# Patient Record
Sex: MALE | Race: BLACK OR AFRICAN AMERICAN | Employment: OTHER | URBAN - METROPOLITAN AREA
[De-identification: names, ages, dates, MRNs, and addresses within clinical notes are randomized per-mention and may not be internally consistent; named-entity substitution may affect disease eponyms.]

---

## 2017-06-07 ENCOUNTER — HOSPITAL ENCOUNTER (OUTPATIENT)
Age: 57
Discharge: HOME | End: 2017-06-07
Payer: COMMERCIAL

## 2017-06-07 LAB — PROSTATE SPECIFIC ANTIGEN: 0.08 UG/L

## 2017-06-07 PROCEDURE — 84153 ASSAY OF PSA TOTAL: CPT

## 2017-06-07 PROCEDURE — 36415 COLL VENOUS BLD VENIPUNCTURE: CPT

## 2018-02-07 ENCOUNTER — OFFICE VISIT (OUTPATIENT)
Dept: INTERNAL MEDICINE | Age: 58
End: 2018-02-07
Payer: COMMERCIAL

## 2018-02-07 VITALS
SYSTOLIC BLOOD PRESSURE: 131 MMHG | OXYGEN SATURATION: 98 % | HEIGHT: 72 IN | WEIGHT: 195 LBS | DIASTOLIC BLOOD PRESSURE: 86 MMHG | RESPIRATION RATE: 12 BRPM | HEART RATE: 96 BPM | BODY MASS INDEX: 26.41 KG/M2

## 2018-02-07 DIAGNOSIS — M54.6 ACUTE LEFT-SIDED THORACIC BACK PAIN: Primary | ICD-10-CM

## 2018-02-07 PROCEDURE — 99202 OFFICE O/P NEW SF 15 MIN: CPT | Performed by: INTERNAL MEDICINE

## 2018-02-07 RX ORDER — CYCLOBENZAPRINE HCL 10 MG
10 TABLET ORAL 3 TIMES DAILY PRN
Qty: 30 TABLET | Refills: 0 | Status: SHIPPED | OUTPATIENT
Start: 2018-02-07 | End: 2018-02-17

## 2018-02-07 ASSESSMENT — ENCOUNTER SYMPTOMS
ALLERGIC/IMMUNOLOGIC NEGATIVE: 1
BACK PAIN: 1
EYES NEGATIVE: 1
RESPIRATORY NEGATIVE: 1
GASTROINTESTINAL NEGATIVE: 1

## 2018-02-07 ASSESSMENT — PATIENT HEALTH QUESTIONNAIRE - PHQ9
1. LITTLE INTEREST OR PLEASURE IN DOING THINGS: 0
SUM OF ALL RESPONSES TO PHQ9 QUESTIONS 1 & 2: 0
2. FEELING DOWN, DEPRESSED OR HOPELESS: 0
SUM OF ALL RESPONSES TO PHQ QUESTIONS 1-9: 0

## 2018-02-07 NOTE — PROGRESS NOTES
addressed. All his questions were answered. Pt voiced understanding. Maggy Graf will continue current medications, diet and exercise. Orders Placed This Encounter   Medications    cyclobenzaprine (FLEXERIL) 10 MG tablet     Sig: Take 1 tablet by mouth 3 times daily as needed for Muscle spasms     Dispense:  30 tablet     Refill:  0          Completed Refills               Requested Prescriptions     Signed Prescriptions Disp Refills    cyclobenzaprine (FLEXERIL) 10 MG tablet 30 tablet 0     Sig: Take 1 tablet by mouth 3 times daily as needed for Muscle spasms       4. Patient given educational materials - see patient instructions    5. Was a self-tracking handout given in paper form or via [employer]? No  If yes, see orders or list here. Orders Placed This Encounter   Procedures    XR CHEST STANDARD (2 VW)       No Follow-up on file. Patient agreed with treatment plan.     Electronically signed by Becky Silva MD on 2/7/2018 at 4:01 PM

## 2018-02-12 ENCOUNTER — HOSPITAL ENCOUNTER (OUTPATIENT)
Age: 58
Discharge: HOME | End: 2018-02-12
Payer: COMMERCIAL

## 2018-02-12 LAB — PROSTATE SPECIFIC ANTIGEN: 0.06 UG/L

## 2018-02-12 PROCEDURE — 36415 COLL VENOUS BLD VENIPUNCTURE: CPT

## 2018-02-12 PROCEDURE — 84153 ASSAY OF PSA TOTAL: CPT

## 2019-04-22 ENCOUNTER — HOSPITAL ENCOUNTER (OUTPATIENT)
Age: 59
Discharge: HOME | End: 2019-04-22
Payer: COMMERCIAL

## 2019-04-22 LAB — PROSTATE SPECIFIC ANTIGEN: 0.03 UG/L

## 2019-04-22 PROCEDURE — 84153 ASSAY OF PSA TOTAL: CPT

## 2019-04-22 PROCEDURE — 36415 COLL VENOUS BLD VENIPUNCTURE: CPT

## 2020-06-09 ENCOUNTER — HOSPITAL ENCOUNTER (OUTPATIENT)
Age: 60
Discharge: HOME | End: 2020-06-09

## 2020-06-09 LAB — PROSTATE SPECIFIC ANTIGEN: 0.02 UG/L

## 2020-06-09 PROCEDURE — 84153 ASSAY OF PSA TOTAL: CPT

## 2020-06-09 PROCEDURE — 36415 COLL VENOUS BLD VENIPUNCTURE: CPT

## 2021-10-21 ENCOUNTER — HOSPITAL ENCOUNTER (OUTPATIENT)
Age: 61
Discharge: HOME | End: 2021-10-21
Payer: COMMERCIAL

## 2021-10-21 LAB — PROSTATE SPECIFIC ANTIGEN: <0.02 UG/L

## 2021-10-21 PROCEDURE — 84153 ASSAY OF PSA TOTAL: CPT

## 2021-10-21 PROCEDURE — 36415 COLL VENOUS BLD VENIPUNCTURE: CPT

## 2022-10-21 ENCOUNTER — HOSPITAL ENCOUNTER (OUTPATIENT)
Age: 62
Discharge: HOME | End: 2022-10-21
Payer: COMMERCIAL

## 2022-10-21 LAB — PROSTATE SPECIFIC ANTIGEN: <0.02 NG/ML

## 2022-10-21 PROCEDURE — 84153 ASSAY OF PSA TOTAL: CPT

## 2022-10-21 PROCEDURE — 36415 COLL VENOUS BLD VENIPUNCTURE: CPT

## 2023-01-10 ENCOUNTER — OFFICE VISIT (OUTPATIENT)
Dept: INTERNAL MEDICINE CLINIC | Age: 63
End: 2023-01-10
Payer: COMMERCIAL

## 2023-01-10 VITALS
DIASTOLIC BLOOD PRESSURE: 88 MMHG | OXYGEN SATURATION: 97 % | WEIGHT: 214 LBS | HEART RATE: 80 BPM | BODY MASS INDEX: 29.43 KG/M2 | SYSTOLIC BLOOD PRESSURE: 132 MMHG

## 2023-01-10 DIAGNOSIS — Z12.11 COLON CANCER SCREENING: ICD-10-CM

## 2023-01-10 DIAGNOSIS — Z13.220 SCREENING FOR HYPERLIPIDEMIA: ICD-10-CM

## 2023-01-10 DIAGNOSIS — Z00.00 WELL ADULT EXAM: ICD-10-CM

## 2023-01-10 DIAGNOSIS — K40.90 RIGHT INGUINAL HERNIA: Primary | ICD-10-CM

## 2023-01-10 PROCEDURE — 99203 OFFICE O/P NEW LOW 30 MIN: CPT | Performed by: INTERNAL MEDICINE

## 2023-01-10 PROCEDURE — G8419 CALC BMI OUT NRM PARAM NOF/U: HCPCS | Performed by: INTERNAL MEDICINE

## 2023-01-10 PROCEDURE — 1036F TOBACCO NON-USER: CPT | Performed by: INTERNAL MEDICINE

## 2023-01-10 PROCEDURE — 3017F COLORECTAL CA SCREEN DOC REV: CPT | Performed by: INTERNAL MEDICINE

## 2023-01-10 PROCEDURE — G8427 DOCREV CUR MEDS BY ELIG CLIN: HCPCS | Performed by: INTERNAL MEDICINE

## 2023-01-10 PROCEDURE — G8484 FLU IMMUNIZE NO ADMIN: HCPCS | Performed by: INTERNAL MEDICINE

## 2023-01-10 RX ORDER — TADALAFIL 5 MG/1
5 TABLET ORAL PRN
COMMUNITY

## 2023-01-10 ASSESSMENT — PATIENT HEALTH QUESTIONNAIRE - PHQ9
SUM OF ALL RESPONSES TO PHQ QUESTIONS 1-9: 0
SUM OF ALL RESPONSES TO PHQ QUESTIONS 1-9: 0
SUM OF ALL RESPONSES TO PHQ9 QUESTIONS 1 & 2: 0
SUM OF ALL RESPONSES TO PHQ QUESTIONS 1-9: 0
1. LITTLE INTEREST OR PLEASURE IN DOING THINGS: 0
2. FEELING DOWN, DEPRESSED OR HOPELESS: 0
SUM OF ALL RESPONSES TO PHQ QUESTIONS 1-9: 0

## 2023-01-10 ASSESSMENT — ENCOUNTER SYMPTOMS: ABDOMINAL PAIN: 1

## 2023-01-10 NOTE — PROGRESS NOTES
Visit Information    Have you changed or started any medications since your last visit including any over-the-counter medicines, vitamins, or herbal medicines? no   Are you having any side effects from any of your medications? -  no  Have you stopped taking any of your medications? Is so, why? -  no    Have you seen any other physician or provider since your last visit? No  Have you had any other diagnostic tests since your last visit? No  Have you been seen in the emergency room and/or had an admission to a hospital since we last saw you? No  Have you had your routine dental cleaning in the past 6 months? no    Have you activated your [employer] account? If not, what are your barriers?  Yes     Patient Care Team:  Leanna Durand MD as PCP - Oniel Wilcox MD as PCP - [facility]    Medical History Review  Past Medical, Family, and Social History reviewed and does contribute to the patient presenting condition    Health Maintenance   Topic Date Due    Depression Screen  Never done    HIV screen  Never done    Hepatitis C screen  Never done    DTaP/Tdap/Td vaccine (1 - Tdap) 07/25/1999    Shingles vaccine (1 of 2) Never done    Lipids  11/20/2020    COVID-19 Vaccine (5 - Booster for Moderna series) 11/09/2021    Flu vaccine (1) 08/01/2022    Prostate Specific Antigen (PSA) Screening or Monitoring  10/21/2023    Colorectal Cancer Screen  01/15/2025    Hepatitis A vaccine  Aged Out    Hib vaccine  Aged Out    Meningococcal (ACWY) vaccine  Aged Out    Pneumococcal 0-64 years Vaccine  Aged Out

## 2023-01-10 NOTE — PROGRESS NOTES
[address] [facility] [facility] [address]  [place] [address]  Dept: [phone]  Dept Fax: [phone]    Cyndie Forrester is a 60 y.o. male who presents today for his medicalconditions/complaints as noted below. Cyndie Forrester is c/o of Groin Swelling (Been going on for awhile) and New Patient (Haven't seen PCP in 2018)      HPI:     Abdominal Pain  This is a chronic problem. The current episode started more than 1 year ago. The problem occurs constantly. The pain is located in the suprapubic region (right inguinal area). The abdominal pain does not radiate. Nothing aggravates the pain. The pain is relieved by Nothing. He has tried nothing for the symptoms. Past Medical History:   Diagnosis Date    Acute left-sided thoracic back pain 2/7/2018    Cancer [facility])      with radiation and seeds, prostate        Current Outpatient Medications   Medication Sig Dispense Refill    tadalafil (CIALIS) 5 MG tablet Take 5 mg by mouth as needed for Erectile Dysfunction       No current facility-administered medications for this visit. No Known Allergies    Health Maintenance   Topic Date Due    Depression Screen  Never done    HIV screen  Never done    Hepatitis C screen  Never done    DTaP/Tdap/Td vaccine (1 - Tdap) 07/25/1999    Shingles vaccine (1 of 2) Never done    Lipids  11/20/2020    COVID-19 Vaccine (5 - Booster for Moderna series) 11/09/2021    Flu vaccine (1) 08/01/2022    Prostate Specific Antigen (PSA) Screening or Monitoring  10/21/2023    Colorectal Cancer Screen  01/15/2025    Hepatitis A vaccine  Aged Out    Hib vaccine  Aged Out    Meningococcal (ACWY) vaccine  Aged Out    Pneumococcal 0-64 years Vaccine  Aged Out       Subjective:      Review of Systems   Gastrointestinal:  Positive for abdominal pain. All other systems reviewed and are negative. Objective:     Physical Exam  Vitals reviewed. Constitutional:       Appearance: He is well-developed.    HENT:      Head: Normocephalic and atraumatic. Eyes:      Conjunctiva/sclera: Conjunctivae normal.      Pupils: Pupils are equal, round, and reactive to light. Neck:      Thyroid: No thyromegaly. Vascular: No JVD. Cardiovascular:      Rate and Rhythm: Normal rate and regular rhythm. Heart sounds: Normal heart sounds. No murmur heard. Pulmonary:      Effort: Pulmonary effort is normal.      Breath sounds: Normal breath sounds. Abdominal:      General: Bowel sounds are normal.      Palpations: Abdomen is soft. Hernia: A hernia is present. Hernia is present in the right inguinal area. Musculoskeletal:         General: Normal range of motion. Cervical back: Normal range of motion and neck supple. Skin:     General: Skin is warm and dry. Neurological:      Mental Status: He is alert and oriented to person, place, and time. Deep Tendon Reflexes: Reflexes are normal and symmetric. /88 (Site: Right Upper Arm, Position: Sitting)   Pulse 80   Wt 214 lb (97.1 kg)   SpO2 97%   BMI 29.43 kg/m²       Assessment:       Diagnosis Orders   1. Right inguinal hernia  CT ABDOMEN PELVIS W IV CONTRAST Additional Contrast? Radiologist Recommendation    [address] [facility], [facility], General Surgery, [place]      2. Screening for hyperlipidemia  Lipid Panel      3. Well adult exam  Basic Metabolic Panel    CBC with Auto Differential      4. Colon cancer screening  Flavio Carvalho MD, Gastroenterology, [place]          Plan:      No follow-ups on file. No orders of the defined types were placed in this encounter.     Orders Placed This Encounter   Procedures    CT ABDOMEN PELVIS W IV CONTRAST Additional Contrast? Radiologist Recommendation     Standing Status:   Future     Standing Expiration Date:   1/10/2024     Order Specific Question:   Additional Contrast?     Answer:   Radiologist Recommendation     Order Specific Question:   STAT Creatinine as needed:     Answer:   No    Lipid Panel     Standing Status: Future     Standing Expiration Date:   1/10/2024     Order Specific Question:   Is Patient Fasting?/# of Hours     Answer:   No    Basic Metabolic Panel     Standing Status:   Future     Standing Expiration Date:   1/10/2024    CBC with Auto Differential     Standing Status:   Future     Standing Expiration Date:   1/10/2024    Gabriel Mejia DO, General Surgery, [place]     Referral Priority:   Routine     Referral Type:   Eval and Treat     Referral Reason:   Specialty Services Required     Referred to Provider:   Blas Scott DO     Requested Specialty:   General Surgery     Number of Visits Requested:   Murray Hernandez MD, Gastroenterology, [place]     Referral Priority:   Routine     Referral Type:   Eval and Treat     Referral Reason:   Specialty Services Required     Referred to Provider:   Alma Alvarado MD     Requested Specialty:   Gastroenterology     Number of Visits Requested:   1            Patient given educational materials - see patient instructions. Discussed use, benefit, and side effects of prescribed medications. All patientquestions answered. Pt voiced understanding.     Electronically signed by Juwan Quiles MD on 1/10/2023at 11:26 AM

## 2023-01-13 ENCOUNTER — HOSPITAL ENCOUNTER (OUTPATIENT)
Dept: CT IMAGING | Age: 63
Discharge: HOME | End: 2023-01-13
Payer: COMMERCIAL

## 2023-01-13 ENCOUNTER — HOSPITAL ENCOUNTER (OUTPATIENT)
Age: 63
Discharge: HOME | End: 2023-01-13
Payer: COMMERCIAL

## 2023-01-13 DIAGNOSIS — Z00.00 WELL ADULT EXAM: ICD-10-CM

## 2023-01-13 DIAGNOSIS — K40.90 RIGHT INGUINAL HERNIA: ICD-10-CM

## 2023-01-13 DIAGNOSIS — Z13.220 SCREENING FOR HYPERLIPIDEMIA: ICD-10-CM

## 2023-01-13 LAB
ABSOLUTE EOS #: 0.1 K/UL (ref 0–0.44)
ABSOLUTE IMMATURE GRANULOCYTE: <0.03 K/UL (ref 0–0.3)
ABSOLUTE LYMPH #: 2.67 K/UL (ref 1.1–3.7)
ABSOLUTE MONO #: 0.65 K/UL (ref 0.1–1.2)
ANION GAP SERPL CALCULATED.3IONS-SCNC: 7 MMOL/L (ref 9–17)
BASOPHILS # BLD: 1 % (ref 0–2)
BASOPHILS ABSOLUTE: 0.05 K/UL (ref 0–0.2)
BUN BLDV-MCNC: 17 MG/DL (ref 8–23)
BUN/CREAT BLD: 14 (ref 9–20)
CALCIUM SERPL-MCNC: 9.1 MG/DL (ref 8.6–10.4)
CHLORIDE BLD-SCNC: 104 MMOL/L (ref 98–107)
CHOLESTEROL/HDL RATIO: 4
CHOLESTEROL: 176 MG/DL
CO2: 29 MMOL/L (ref 20–31)
CREAT SERPL-MCNC: 1.19 MG/DL (ref 0.7–1.2)
EOSINOPHILS RELATIVE PERCENT: 2 % (ref 1–4)
GFR SERPL CREATININE-BSD FRML MDRD: >60 ML/MIN/1.73M2
GLUCOSE BLD-MCNC: 107 MG/DL (ref 70–99)
HCT VFR BLD CALC: 48.7 % (ref 40.7–50.3)
HDLC SERPL-MCNC: 44 MG/DL
HEMOGLOBIN: 16.3 G/DL (ref 13–17)
IMMATURE GRANULOCYTES: 0 %
LDL CHOLESTEROL: 120 MG/DL (ref 0–130)
LYMPHOCYTES # BLD: 39 % (ref 24–43)
MCH RBC QN AUTO: 28.9 PG (ref 25.2–33.5)
MCHC RBC AUTO-ENTMCNC: 33.5 G/DL (ref 28.4–34.8)
MCV RBC AUTO: 86.3 FL (ref 82.6–102.9)
MONOCYTES # BLD: 9 % (ref 3–12)
NRBC AUTOMATED: 0 PER 100 WBC
PDW BLD-RTO: 14.3 % (ref 11.8–14.4)
PLATELET # BLD: 178 K/UL (ref 138–453)
PMV BLD AUTO: 11.3 FL (ref 8.1–13.5)
POTASSIUM SERPL-SCNC: 4.3 MMOL/L (ref 3.7–5.3)
RBC # BLD: 5.64 M/UL (ref 4.21–5.77)
SEG NEUTROPHILS: 49 % (ref 36–65)
SEGMENTED NEUTROPHILS ABSOLUTE COUNT: 3.4 K/UL (ref 1.5–8.1)
SODIUM BLD-SCNC: 140 MMOL/L (ref 135–144)
TRIGL SERPL-MCNC: 61 MG/DL
WBC # BLD: 6.9 K/UL (ref 3.5–11.3)

## 2023-01-13 PROCEDURE — 6360000004 HC RX CONTRAST MEDICATION: Performed by: INTERNAL MEDICINE

## 2023-01-13 PROCEDURE — 80061 LIPID PANEL: CPT

## 2023-01-13 PROCEDURE — 80048 BASIC METABOLIC PNL TOTAL CA: CPT

## 2023-01-13 PROCEDURE — A4641 RADIOPHARM DX AGENT NOC: HCPCS | Performed by: INTERNAL MEDICINE

## 2023-01-13 PROCEDURE — 36415 COLL VENOUS BLD VENIPUNCTURE: CPT

## 2023-01-13 PROCEDURE — 85025 COMPLETE CBC W/AUTO DIFF WBC: CPT

## 2023-01-13 PROCEDURE — 2580000003 HC RX 258: Performed by: INTERNAL MEDICINE

## 2023-01-13 PROCEDURE — 74177 CT ABD & PELVIS W/CONTRAST: CPT

## 2023-01-13 RX ORDER — SODIUM CHLORIDE 0.9 % (FLUSH) 0.9 %
10 SYRINGE (ML) INJECTION ONCE
Status: COMPLETED | OUTPATIENT
Start: 2023-01-13 | End: 2023-01-13

## 2023-01-13 RX ORDER — 0.9 % SODIUM CHLORIDE 0.9 %
80 INTRAVENOUS SOLUTION INTRAVENOUS ONCE
Status: COMPLETED | OUTPATIENT
Start: 2023-01-13 | End: 2023-01-13

## 2023-01-13 RX ADMIN — SODIUM CHLORIDE 80 ML: 9 INJECTION, SOLUTION INTRAVENOUS at 13:52

## 2023-01-13 RX ADMIN — SODIUM CHLORIDE, PRESERVATIVE FREE 10 ML: 5 INJECTION INTRAVENOUS at 13:52

## 2023-01-13 RX ADMIN — IOPAMIDOL 75 ML: 755 INJECTION, SOLUTION INTRAVENOUS at 13:46

## 2023-01-13 RX ADMIN — BARIUM SULFATE 450 ML: 20 SUSPENSION ORAL at 13:52

## 2023-01-30 ENCOUNTER — OFFICE VISIT (OUTPATIENT)
Dept: SURGERY | Age: 63
End: 2023-01-30
Payer: COMMERCIAL

## 2023-01-30 VITALS
TEMPERATURE: 97.8 F | WEIGHT: 213 LBS | BODY MASS INDEX: 29.29 KG/M2 | SYSTOLIC BLOOD PRESSURE: 138 MMHG | HEART RATE: 84 BPM | DIASTOLIC BLOOD PRESSURE: 91 MMHG

## 2023-01-30 DIAGNOSIS — K40.90 NON-RECURRENT UNILATERAL INGUINAL HERNIA WITHOUT OBSTRUCTION OR GANGRENE: Primary | ICD-10-CM

## 2023-01-30 PROCEDURE — 3017F COLORECTAL CA SCREEN DOC REV: CPT | Performed by: STUDENT IN AN ORGANIZED HEALTH CARE EDUCATION/TRAINING PROGRAM

## 2023-01-30 PROCEDURE — G8484 FLU IMMUNIZE NO ADMIN: HCPCS | Performed by: STUDENT IN AN ORGANIZED HEALTH CARE EDUCATION/TRAINING PROGRAM

## 2023-01-30 PROCEDURE — G8427 DOCREV CUR MEDS BY ELIG CLIN: HCPCS | Performed by: STUDENT IN AN ORGANIZED HEALTH CARE EDUCATION/TRAINING PROGRAM

## 2023-01-30 PROCEDURE — G8419 CALC BMI OUT NRM PARAM NOF/U: HCPCS | Performed by: STUDENT IN AN ORGANIZED HEALTH CARE EDUCATION/TRAINING PROGRAM

## 2023-01-30 PROCEDURE — 99204 OFFICE O/P NEW MOD 45 MIN: CPT | Performed by: STUDENT IN AN ORGANIZED HEALTH CARE EDUCATION/TRAINING PROGRAM

## 2023-01-30 PROCEDURE — 1036F TOBACCO NON-USER: CPT | Performed by: STUDENT IN AN ORGANIZED HEALTH CARE EDUCATION/TRAINING PROGRAM

## 2023-01-30 RX ORDER — SODIUM CHLORIDE 0.9 % (FLUSH) 0.9 %
5-40 SYRINGE (ML) INJECTION EVERY 12 HOURS SCHEDULED
OUTPATIENT
Start: 2023-01-30

## 2023-01-30 RX ORDER — SODIUM CHLORIDE, SODIUM LACTATE, POTASSIUM CHLORIDE, CALCIUM CHLORIDE 600; 310; 30; 20 MG/100ML; MG/100ML; MG/100ML; MG/100ML
INJECTION, SOLUTION INTRAVENOUS CONTINUOUS
OUTPATIENT
Start: 2023-01-30

## 2023-01-30 RX ORDER — SODIUM CHLORIDE 9 MG/ML
INJECTION, SOLUTION INTRAVENOUS PRN
OUTPATIENT
Start: 2023-01-30

## 2023-01-30 RX ORDER — SODIUM CHLORIDE 0.9 % (FLUSH) 0.9 %
5-40 SYRINGE (ML) INJECTION PRN
OUTPATIENT
Start: 2023-01-30

## 2023-01-30 NOTE — PROGRESS NOTES
History and Physical - Surgery Clinic    Patient's Name: Estefani Khanna  MRN: [id number]  YOB: 1960 (60 y.o.)    Date of Visit: January 30, 2023     CC: Right inguinal hernia    HPI: Estefani Khanna is a/an 60 y.o. male who presents to [address] [place] [address] for evaluation of right inguinal hernia. Patient states that several months ago he noticed a bulge in his right groin which continues to increase in size. He denies discomfort, but states that recently when he eats he becomes full quickly and had increased reflux which is not normal for him. He denies changes in bowel movements. He is up-to-date on colon cancer screening. He does have a history of prostate cancer status post radiation and seeds. Patient underwent a CT scan prior to evaluation with oral contrast which notes that there is a right inguinal hernia that is can tenting bowel. Contrast moves through the hernia without issues. Past Medical History:   Diagnosis Date    Acute left-sided thoracic back pain 2/7/2018    Cancer [facility])      with radiation and seeds, prostate       No past surgical history on file. Current Outpatient Medications   Medication Sig Dispense Refill    tadalafil (CIALIS) 5 MG tablet Take 5 mg by mouth as needed for Erectile Dysfunction       No current facility-administered medications for this visit. No Known Allergies    No family history on file.     Social History     Socioeconomic History    Marital status:      Spouse name: Not on file    Number of children: Not on file    Years of education: Not on file    Highest education level: Not on file   Occupational History    Not on file   Tobacco Use    Smoking status: Never    Smokeless tobacco: Never   Substance and Sexual Activity    Alcohol use: No    Drug use: No    Sexual activity: Not on file   Other Topics Concern    Not on file   Social History Narrative    Not on file     Social Determinants of Health     Financial Resource Strain: Not on file   Food Insecurity: Not on file   Transportation Needs: Not on file   Physical Activity: Not on file   Stress: Not on file   Social Connections: Not on file   Intimate Partner Violence: Not on file   Housing Stability: Not on file       Review of Systems:   GEN: Denies recent weight loss, fatigue, fevers, chills. HEENT: No rhinorrhea, dysphagia, odynphagia. CV: Denies recent chest pain. No history of MI or arrhythmias. RESP: Denies shortness of breath, COPD, asthma. GI: As per HPI  : History of prostate cancer  HEM[de-identified] Denies history of anemia or DVTs. ENDO: Denies history of thyroid problems or diabetes. MSK: Denies joint and back pain. NEURO: Denies history of previous stroke    Physical Exam:    Vitals:    01/30/23 0930   BP: (!) 138/91   Pulse: 84   Temp: 97.8 °F (36.6 °C)       General: Alert and oriented x 3. Non toxic in appearance. No acute distress. Head[de-identified]  Non traumatic  Eyes: pupils reactive,  EOMI bilaterally  Neck: trachea midline. Heart: Regular rate and rhythm. Lungs: No respiratory distress, clear breath sounds bilaterally. Abdomen: Soft, nondistended, nontender to palpation. Hernia defect noted in the right groin and easily reducible. Extremity: No gross deformity in all four extremities. Skin: No skin lesion, erythema, rash. Neuro: CN II-XII grossly intact. No motor or sensory deficits appreciated. MSK: Strength intact in all four extremities. Imaging:     CT scan performed on 1/13/2023 was reviewed. An obvious right inguinal hernia is identified with small bowel noted. There is a possible left inguinal hernia as well. Seeds noted around the area of the prostate. Patient also has liver cysts which appear to be benign noted on exam.    Assessment  Reducible right inguinal hernia      Plan:  Plan to take the patient to the operating room for robotic assisted laparoscopic right inguinal hernia repair, possible mesh, possible bilateral, all indicated procedures. The procedure was explained in detail along with risk versus benefits and alternatives to the patient. I reviewed the CT scan and the anatomy of the with [facility] as well. I answered and addressed all of his questions and concerns. I discussed the possibility he may have a hernia on the left side, which she verbalized he would like to proceed with a bilateral inguinal hernia repair if needed. I educated [facility] on the warning signs of incarcerated inguinal hernia including pain at the groin, inability for the hernia to be reduced induced, overlying skin changes, inability to keep food or water down, fevers, chills. [facility] verbalized understanding and knows to proceed to the emergency department if any of these occur between now and the OR. We discussed postoperatively patient would have a 2-week restriction of no lifting, pushing, pulling greater than 10 pounds. No driving while taking narcotics or until he feels safe that he can slam on the brakes and the need to avoid a car wreck. With follow-up with the patient 2 weeks postoperatively.       Rona Fabian, DO  1/30/2023

## 2023-01-31 NOTE — H&P
HISTORY and [facility] [address]       NAME:  Katelyn Mckee  MRN: [id number]   YOB: 1960   Date: 2023   Age: 60 y.o. Gender: male       COMPLAINT AND PRESENT HISTORY:       Katelyn Mckee is 60 y.o.,  male, Preadmission Testing for : RIGHT INGUINAL HERNIA. Patient will be havin [facility] W/MESH, POSSIBLE OPEN, POSSIBLE BILATERAL, ALL INDICATED PROCEDURES    See office notes of Dr. Riya Ortega on 2023  CC: Right inguinal hernia  HPI: Katelyn Mckee is a/an 60 y.o. male who presents to [facility] for evaluation of right inguinal hernia. Patient states that several months ago he noticed a bulge in his right groin which continues to increase in size. He denies discomfort, but states that recently when he eats he becomes full quickly and had increased reflux which is not normal for him. He denies changes in bowel movements. He is up-to-date on colon cancer screening. He does have a history of prostate cancer status post radiation and seeds. Patient underwent a CT scan prior to evaluation with oral contrast which notes that there is a right inguinal hernia that is can tenting bowel. Contrast moves through the hernia without issues. Above reviewed with patient and he concurs    Patient has symptoms of : bulging out . Patient noticed the Hernia 2 months ago. Patient reports that the Hernia grew in size and is  non tender and more expansile . Pt states that now he is more conscious of it. Pt reports that he is  more relieved with hernia when: lying down ; standing aggravates hernia more. Pt denies any constipation or bowel blockage. No fever or chills. No significant medical history. Today patient exhibits BP with elevated diastolic reading. Writer did manual BP and did obtain 138/ 90. Patient was advised to monitor at home. Prior reading indicate normal readings. Pt denies any sxs.       Anticoagulants or blood thinners: no     Pt denies any  chest pain, palpitations or diaphoresis. No recent URI, SOB, fever or chills. Patient denies any personal hx of blood clots. Functional Capacity per patient:              1. Patient is able to walk 2 city blocks on level ground without SOB. 2. Patient is able to climb 2 flights of stairs without SOB. Patient denies any personal or family hx of problems with  Anesthesia. Imaging:   CT OF THE ABDOMEN AND PELVIS WITH CONTRAST 1/13/2023  1. Small bowel containing right inguinal hernia without associated  inflammation or obstruction. 2. Chronic findings outlined in the body of the report. PAST MEDICAL HISTORY     Past Medical History:   Diagnosis Date    Acute left-sided thoracic back pain 02/07/2018    Cancer [facility])      with radiation and seeds, prostate    Macular degeneration     Left eye     SURGICAL HISTORY       Past Surgical History:   Procedure Laterality Date    COLONOSCOPY         FAMILY HISTORY     History reviewed. No pertinent family history. SOCIAL HISTORY       Social History     Socioeconomic History    Marital status:      Spouse name: None    Number of children: None    Years of education: None    Highest education level: None   Tobacco Use    Smoking status: Never    Smokeless tobacco: Never   Vaping Use    Vaping Use: Never used   Substance and Sexual Activity    Alcohol use: No    Drug use: No           REVIEW OF SYSTEMS      No Known Allergies    Current Outpatient Medications on File Prior to Encounter   Medication Sig Dispense Refill    tadalafil (CIALIS) 5 MG tablet Take 5 mg by mouth as needed for Erectile Dysfunction       No current facility-administered medications on file prior to encounter. General health:  Fairly good. No fever or chills. Skin:  No itching, redness or rash. HEENT:  No headache, epistaxis or sore throat. Neck:  No pain, stiffness or masses. Cardiovascular/Respiratory system:  No chest pain, palpitation or shortness of breath. Gastrointestinal tract: See HPI. Genitourinary:  No burning on micturition. No hesitancy, urgency, frequency or discoloration of urine. Locomotor:  No bone or joint pains. No swelling. Neuropsychiatric:  No referable complaints. Negative except for what is mentioned in the HPI. GENERAL PHYSICAL EXAM:     Vitals: BP (!) 135/100   Pulse 68   Temp 97.9 °F (36.6 °C) (Temporal)   Resp 20   Ht 5' 11.5\" (1.816 m)   Wt 213 lb (96.6 kg)   SpO2 100%   BMI 29.29 kg/m²  Body mass index is 29.29 kg/m². Retaken at  138/90 RA. GENERAL APPEARANCE:   Maria C Tineo is 60 y.o., male, moderately obese, nourished, conscious, alert. Does not appear to be distress or pain at this time. SKIN:  Warm, dry, no cyanosis or jaundice. HEAD:  Normocephalic, atraumatic, no swelling or tenderness. EYES:  Pupils equal, reactive to light. EARS:  No discharge, no marked hearing loss. NOSE:  No rhinorrhea, epistaxis or septal deformity. THROAT:  Not congested. No ulceration bleeding or discharge. NECK:  No stiffness, trachea central.                  CHEST:  Symmetrical and equal on expansion. HEART:  RRR S1 > S2. No audible murmurs or gallops. LUNGS:  Equal on expansion, normal breath sounds. No adventitious sounds. ABDOMEN:  Obese. Soft on palpation. Normoactive bowel sounds. No localized tenderness. No guarding or rigidity. Inguinal Hernia- exam deferred to surgeon         LYMPHATICS:  No palpable cervical lymphadenopathy. LOCOMOTOR, BACK AND SPINE:  No tenderness or deformities. EXTREMITIES:  Symmetrical, no pretibial edema. No calf tenderness.   No discoloration or ulcerations. NEUROLOGIC:  The patient is conscious, alert, oriented,Cranial nerve II-XII intact, taste and smell were not examined. No apparent focal sensory or motor deficits. Lab Results   Component Value Date    WBC 6.9 01/13/2023    RBC 5.64 01/13/2023    HGB 16.3 01/13/2023    HCT 48.7 01/13/2023    MCV 86.3 01/13/2023    MCH 28.9 01/13/2023    MCHC 33.5 01/13/2023    RDW 14.3 01/13/2023     01/13/2023    MPV 11.3 01/13/2023        Lab Results   Component Value Date     01/13/2023    K 4.3 01/13/2023     01/13/2023    CO2 29 01/13/2023    BUN 17 01/13/2023    CREATININE 1.19 01/13/2023    GLUCOSE 107 (H) 01/13/2023    CALCIUM 9.1 01/13/2023                                         EKG REVIEW        Today's EKG reads Normal  sinus rhythm with left atrial enlargement           PROVISIONAL DIAGNOSES / SURGERY:      LAPAROSCOPIC ROBOTIC XI RIGHT INGUINAL HERNIA REPAIR W/MESH, POSSIBLE OPEN, POSSIBLE BILATERAL, ALL INDICATED PROCEDURES    RIGHT INGUINAL HERNIA      Patient Active Problem List    Diagnosis Date Noted    Non-recurrent unilateral inguinal hernia without obstruction or gangrene 01/30/2023    Acute left-sided thoracic back pain 02/07/2018    Elevated blood pressure reading 03/26/2014    Prostate cancer ([facility] [address] 10/22/2013    Dysuria 10/22/2013       CLEARANCE: Dr. Natty Hernandez, anesthesia, was contacted and informed of patient's history and planned surgery. Orders received and no clearance required.       ZEINA HANEY, APRN - CNP on 2/1/2023 at 12:12 PM    Total time spent on encounter- PAT provider minutes: 21-30 minutes

## 2023-02-01 ENCOUNTER — HOSPITAL ENCOUNTER (OUTPATIENT)
Dept: PREADMISSION TESTING | Age: 63
Discharge: HOME | End: 2023-02-01
Attending: STUDENT IN AN ORGANIZED HEALTH CARE EDUCATION/TRAINING PROGRAM | Admitting: STUDENT IN AN ORGANIZED HEALTH CARE EDUCATION/TRAINING PROGRAM
Payer: COMMERCIAL

## 2023-02-01 VITALS
TEMPERATURE: 97.9 F | HEART RATE: 68 BPM | SYSTOLIC BLOOD PRESSURE: 135 MMHG | BODY MASS INDEX: 28.85 KG/M2 | RESPIRATION RATE: 20 BRPM | WEIGHT: 213 LBS | OXYGEN SATURATION: 100 % | DIASTOLIC BLOOD PRESSURE: 100 MMHG | HEIGHT: 72 IN

## 2023-02-01 PROBLEM — Z01.818 PRE-OPERATIVE CLEARANCE: Status: ACTIVE | Noted: 2023-02-01

## 2023-02-01 PROCEDURE — APPSS45 APP SPLIT SHARED TIME 31-45 MINUTES: Performed by: NURSE PRACTITIONER

## 2023-02-01 PROCEDURE — 93005 ELECTROCARDIOGRAM TRACING: CPT | Performed by: ANESTHESIOLOGY

## 2023-02-01 NOTE — DISCHARGE INSTRUCTIONS
Pre-op Instructions For Out-Patient Surgery    Medication Instructions:  Please stop herbs and any supplements now (includes vitamins and minerals). Please contact your surgeon and prescribing physician for pre-op instructions for any blood thinners. If you have inhalers/aerosol treatments at home, please use them the morning of your surgery and bring the inhalers with you to the hospital.    Please take the following medications the morning of your surgery with a sip of water:    None     Surgery Instructions:  After midnight before surgery:  Do not eat or drink anything, including water, mints, gum, and hard candy. You may brush your teeth without swallowing. No smoking, chewing tobacco, or street drugs. Please shower or bathe before surgery. If you were given Surgical Scrub Chlorhexidine Gluconate Liquid (CHG), please shower the night before and the morning of your surgery following the detailed instructions you received during your pre-admission visit. Please do not wear any cologne, lotion, powder, deodorant, jewelry, piercings, perfume, makeup, nail polish, hair accessories, or hair spray on the day of surgery. Wear loose comfortable clothing. Leave your valuables at home. Bring a storage case for any glasses/contacts. An adult who is responsible for you MUST drive you home and should be with you for the first 24 hours after surgery. If having out-patient knee and foot surgeries, please arrange for planned crutches, walker, or wheelchair before arriving to the hospital.    The Day of Surgery:  Arrive at [facility] at the time directed by your surgeon and check in at the desk. If you have a living will or healthcare power of , please bring a copy. You will be taken to the pre-op holding area where you will be prepared for surgery.   A physical assessment will be performed by a nurse practitioner or house officer. Your IV will be started and you will meet your anesthesiologist.    When you go to surgery, your family will be directed to the surgical waiting room, where the doctor should speak with them after your surgery. After surgery, you will be taken to the recovery room then when you are awake and stable you will go to the short stay unit for preparation to be discharged. If you use a Bi-PAP or C-PAP machine, please bring it with you and leave it in the car in case it is needed in recovery room.

## 2023-02-02 LAB
EKG ATRIAL RATE: 72 BPM
EKG P AXIS: 51 DEGREES
EKG P-R INTERVAL: 158 MS
EKG Q-T INTERVAL: 372 MS
EKG QRS DURATION: 70 MS
EKG QTC CALCULATION (BAZETT): 407 MS
EKG R AXIS: 9 DEGREES
EKG T AXIS: 11 DEGREES
EKG VENTRICULAR RATE: 72 BPM

## 2023-02-02 PROCEDURE — 93010 ELECTROCARDIOGRAM REPORT: CPT | Performed by: INTERNAL MEDICINE

## 2023-02-09 ENCOUNTER — ANESTHESIA EVENT (OUTPATIENT)
Dept: OPERATING ROOM | Age: 63
End: 2023-02-09
Payer: COMMERCIAL

## 2023-02-09 NOTE — PRE-PROCEDURE INSTRUCTIONS
Nothing to eat after midnight. Y  Are you taking any blood thinners? When was the last day? N  Make sure to use Hibiclens prior to surgery. Y  Remove any jewelry and body piercings. Y  Do you wear glasses? If so, please bring a case to store them in. Y  Are you having any Covid symptoms? N  Do you have any new rashes, infections, etc. that we should be aware of? N  Do you have a ride home the day of surgery? It cannot be a cab or medical transportation.  Y  Verify surgery time and what time to arrive at hospital. [id number]

## 2023-02-10 ENCOUNTER — ANESTHESIA (OUTPATIENT)
Dept: OPERATING ROOM | Age: 63
End: 2023-02-10
Payer: COMMERCIAL

## 2023-02-10 ENCOUNTER — HOSPITAL ENCOUNTER (OUTPATIENT)
Age: 63
Setting detail: OUTPATIENT SURGERY
Discharge: HOME | End: 2023-02-10
Attending: STUDENT IN AN ORGANIZED HEALTH CARE EDUCATION/TRAINING PROGRAM | Admitting: STUDENT IN AN ORGANIZED HEALTH CARE EDUCATION/TRAINING PROGRAM
Payer: COMMERCIAL

## 2023-02-10 VITALS
OXYGEN SATURATION: 100 % | DIASTOLIC BLOOD PRESSURE: 86 MMHG | TEMPERATURE: 99.3 F | RESPIRATION RATE: 14 BRPM | HEART RATE: 95 BPM | SYSTOLIC BLOOD PRESSURE: 150 MMHG

## 2023-02-10 DIAGNOSIS — G89.18 ACUTE POST-OPERATIVE PAIN: Primary | ICD-10-CM

## 2023-02-10 PROCEDURE — 2580000003 HC RX 258: Performed by: NURSE ANESTHETIST, CERTIFIED REGISTERED

## 2023-02-10 PROCEDURE — 6360000002 HC RX W HCPCS: Performed by: ANESTHESIOLOGY

## 2023-02-10 PROCEDURE — 7100000011 HC PHASE II RECOVERY - ADDTL 15 MIN: Performed by: STUDENT IN AN ORGANIZED HEALTH CARE EDUCATION/TRAINING PROGRAM

## 2023-02-10 PROCEDURE — 7100000031 HC ASPR PHASE II RECOVERY - ADDTL 15 MIN: Performed by: STUDENT IN AN ORGANIZED HEALTH CARE EDUCATION/TRAINING PROGRAM

## 2023-02-10 PROCEDURE — 3600000009 HC SURGERY ROBOT BASE: Performed by: STUDENT IN AN ORGANIZED HEALTH CARE EDUCATION/TRAINING PROGRAM

## 2023-02-10 PROCEDURE — 3600000019 HC SURGERY ROBOT ADDTL 15MIN: Performed by: STUDENT IN AN ORGANIZED HEALTH CARE EDUCATION/TRAINING PROGRAM

## 2023-02-10 PROCEDURE — S2900 ROBOTIC SURGICAL SYSTEM: HCPCS | Performed by: STUDENT IN AN ORGANIZED HEALTH CARE EDUCATION/TRAINING PROGRAM

## 2023-02-10 PROCEDURE — 2500000003 HC RX 250 WO HCPCS: Performed by: NURSE ANESTHETIST, CERTIFIED REGISTERED

## 2023-02-10 PROCEDURE — 3700000001 HC ADD 15 MINUTES (ANESTHESIA): Performed by: STUDENT IN AN ORGANIZED HEALTH CARE EDUCATION/TRAINING PROGRAM

## 2023-02-10 PROCEDURE — 3700000000 HC ANESTHESIA ATTENDED CARE: Performed by: STUDENT IN AN ORGANIZED HEALTH CARE EDUCATION/TRAINING PROGRAM

## 2023-02-10 PROCEDURE — 2500000003 HC RX 250 WO HCPCS: Performed by: ANESTHESIOLOGY

## 2023-02-10 PROCEDURE — 7100000000 HC PACU RECOVERY - FIRST 15 MIN: Performed by: STUDENT IN AN ORGANIZED HEALTH CARE EDUCATION/TRAINING PROGRAM

## 2023-02-10 PROCEDURE — 6370000000 HC RX 637 (ALT 250 FOR IP): Performed by: ANESTHESIOLOGY

## 2023-02-10 PROCEDURE — 7100000030 HC ASPR PHASE II RECOVERY - FIRST 15 MIN: Performed by: STUDENT IN AN ORGANIZED HEALTH CARE EDUCATION/TRAINING PROGRAM

## 2023-02-10 PROCEDURE — 6360000002 HC RX W HCPCS: Performed by: NURSE ANESTHETIST, CERTIFIED REGISTERED

## 2023-02-10 PROCEDURE — 2580000003 HC RX 258: Performed by: STUDENT IN AN ORGANIZED HEALTH CARE EDUCATION/TRAINING PROGRAM

## 2023-02-10 PROCEDURE — 6360000002 HC RX W HCPCS: Performed by: STUDENT IN AN ORGANIZED HEALTH CARE EDUCATION/TRAINING PROGRAM

## 2023-02-10 PROCEDURE — 2500000003 HC RX 250 WO HCPCS: Performed by: STUDENT IN AN ORGANIZED HEALTH CARE EDUCATION/TRAINING PROGRAM

## 2023-02-10 PROCEDURE — C1781 MESH (IMPLANTABLE): HCPCS | Performed by: STUDENT IN AN ORGANIZED HEALTH CARE EDUCATION/TRAINING PROGRAM

## 2023-02-10 PROCEDURE — 7100000010 HC PHASE II RECOVERY - FIRST 15 MIN: Performed by: STUDENT IN AN ORGANIZED HEALTH CARE EDUCATION/TRAINING PROGRAM

## 2023-02-10 PROCEDURE — 2709999900 HC NON-CHARGEABLE SUPPLY: Performed by: STUDENT IN AN ORGANIZED HEALTH CARE EDUCATION/TRAINING PROGRAM

## 2023-02-10 PROCEDURE — 7100000001 HC PACU RECOVERY - ADDTL 15 MIN: Performed by: STUDENT IN AN ORGANIZED HEALTH CARE EDUCATION/TRAINING PROGRAM

## 2023-02-10 DEVICE — MESH HERN W16XL12CM LAP MFIL POLY TEREPHTHALATE MFIL: Type: IMPLANTABLE DEVICE | Site: GROIN | Status: FUNCTIONAL

## 2023-02-10 RX ORDER — SODIUM CHLORIDE 9 MG/ML
INJECTION, SOLUTION INTRAVENOUS PRN
Status: DISCONTINUED | OUTPATIENT
Start: 2023-02-10 | End: 2023-02-10 | Stop reason: HOSPADM

## 2023-02-10 RX ORDER — SODIUM CHLORIDE 0.9 % (FLUSH) 0.9 %
5-40 SYRINGE (ML) INJECTION PRN
Status: DISCONTINUED | OUTPATIENT
Start: 2023-02-10 | End: 2023-02-10 | Stop reason: HOSPADM

## 2023-02-10 RX ORDER — LIDOCAINE HYDROCHLORIDE AND EPINEPHRINE 10; 10 MG/ML; UG/ML
INJECTION, SOLUTION INFILTRATION; PERINEURAL PRN
Status: DISCONTINUED | OUTPATIENT
Start: 2023-02-10 | End: 2023-02-10 | Stop reason: ALTCHOICE

## 2023-02-10 RX ORDER — HYDRALAZINE HYDROCHLORIDE 20 MG/ML
10 INJECTION INTRAMUSCULAR; INTRAVENOUS
Status: DISCONTINUED | OUTPATIENT
Start: 2023-02-10 | End: 2023-02-10 | Stop reason: HOSPADM

## 2023-02-10 RX ORDER — ACETAMINOPHEN 500 MG
1000 TABLET ORAL ONCE
Status: COMPLETED | OUTPATIENT
Start: 2023-02-10 | End: 2023-02-10

## 2023-02-10 RX ORDER — ROCURONIUM BROMIDE 10 MG/ML
INJECTION, SOLUTION INTRAVENOUS PRN
Status: DISCONTINUED | OUTPATIENT
Start: 2023-02-10 | End: 2023-02-10 | Stop reason: SDUPTHER

## 2023-02-10 RX ORDER — ONDANSETRON 4 MG/1
4 TABLET, FILM COATED ORAL 3 TIMES DAILY PRN
Qty: 15 TABLET | Refills: 0 | Status: SHIPPED | OUTPATIENT
Start: 2023-02-10

## 2023-02-10 RX ORDER — EPHEDRINE SULFATE 50 MG/ML
INJECTION, SOLUTION INTRAVENOUS PRN
Status: DISCONTINUED | OUTPATIENT
Start: 2023-02-10 | End: 2023-02-10 | Stop reason: SDUPTHER

## 2023-02-10 RX ORDER — SODIUM CHLORIDE 0.9 % (FLUSH) 0.9 %
5-40 SYRINGE (ML) INJECTION EVERY 12 HOURS SCHEDULED
Status: DISCONTINUED | OUTPATIENT
Start: 2023-02-10 | End: 2023-02-10 | Stop reason: HOSPADM

## 2023-02-10 RX ORDER — OXYCODONE HYDROCHLORIDE 5 MG/1
5 TABLET ORAL EVERY 6 HOURS PRN
Qty: 20 TABLET | Refills: 0 | Status: SHIPPED | OUTPATIENT
Start: 2023-02-10 | End: 2023-02-15

## 2023-02-10 RX ORDER — SODIUM CHLORIDE, SODIUM LACTATE, POTASSIUM CHLORIDE, CALCIUM CHLORIDE 600; 310; 30; 20 MG/100ML; MG/100ML; MG/100ML; MG/100ML
INJECTION, SOLUTION INTRAVENOUS CONTINUOUS
Status: DISCONTINUED | OUTPATIENT
Start: 2023-02-10 | End: 2023-02-10 | Stop reason: HOSPADM

## 2023-02-10 RX ORDER — FENTANYL CITRATE 50 UG/ML
INJECTION, SOLUTION INTRAMUSCULAR; INTRAVENOUS PRN
Status: DISCONTINUED | OUTPATIENT
Start: 2023-02-10 | End: 2023-02-10 | Stop reason: SDUPTHER

## 2023-02-10 RX ORDER — GABAPENTIN 300 MG/1
300 CAPSULE ORAL ONCE
Status: COMPLETED | OUTPATIENT
Start: 2023-02-10 | End: 2023-02-10

## 2023-02-10 RX ORDER — FENTANYL CITRATE 0.05 MG/ML
25 INJECTION, SOLUTION INTRAMUSCULAR; INTRAVENOUS EVERY 5 MIN PRN
Status: DISCONTINUED | OUTPATIENT
Start: 2023-02-10 | End: 2023-02-10 | Stop reason: HOSPADM

## 2023-02-10 RX ORDER — DIPHENHYDRAMINE HYDROCHLORIDE 50 MG/ML
12.5 INJECTION INTRAMUSCULAR; INTRAVENOUS
Status: DISCONTINUED | OUTPATIENT
Start: 2023-02-10 | End: 2023-02-10 | Stop reason: HOSPADM

## 2023-02-10 RX ORDER — ONDANSETRON 2 MG/ML
4 INJECTION INTRAMUSCULAR; INTRAVENOUS
Status: COMPLETED | OUTPATIENT
Start: 2023-02-10 | End: 2023-02-10

## 2023-02-10 RX ORDER — MIDAZOLAM HYDROCHLORIDE 1 MG/ML
INJECTION INTRAMUSCULAR; INTRAVENOUS PRN
Status: DISCONTINUED | OUTPATIENT
Start: 2023-02-10 | End: 2023-02-10 | Stop reason: SDUPTHER

## 2023-02-10 RX ORDER — DOCUSATE SODIUM 100 MG/1
100 CAPSULE, LIQUID FILLED ORAL 2 TIMES DAILY
Qty: 60 CAPSULE | Refills: 0 | Status: SHIPPED | OUTPATIENT
Start: 2023-02-10 | End: 2023-03-12

## 2023-02-10 RX ORDER — SUCCINYLCHOLINE/SOD CL,ISO/PF 200MG/10ML
SYRINGE (ML) INTRAVENOUS PRN
Status: DISCONTINUED | OUTPATIENT
Start: 2023-02-10 | End: 2023-02-10 | Stop reason: SDUPTHER

## 2023-02-10 RX ORDER — METOCLOPRAMIDE HYDROCHLORIDE 5 MG/ML
10 INJECTION INTRAMUSCULAR; INTRAVENOUS
Status: COMPLETED | OUTPATIENT
Start: 2023-02-10 | End: 2023-02-10

## 2023-02-10 RX ORDER — PROPOFOL 10 MG/ML
INJECTION, EMULSION INTRAVENOUS PRN
Status: DISCONTINUED | OUTPATIENT
Start: 2023-02-10 | End: 2023-02-10 | Stop reason: SDUPTHER

## 2023-02-10 RX ORDER — LIDOCAINE HYDROCHLORIDE 10 MG/ML
1 INJECTION, SOLUTION EPIDURAL; INFILTRATION; INTRACAUDAL; PERINEURAL
Status: COMPLETED | OUTPATIENT
Start: 2023-02-10 | End: 2023-02-10

## 2023-02-10 RX ORDER — LABETALOL HYDROCHLORIDE 5 MG/ML
10 INJECTION, SOLUTION INTRAVENOUS
Status: DISCONTINUED | OUTPATIENT
Start: 2023-02-10 | End: 2023-02-10 | Stop reason: HOSPADM

## 2023-02-10 RX ORDER — HYDROCODONE BITARTRATE AND ACETAMINOPHEN 5; 325 MG/1; MG/1
1 TABLET ORAL EVERY 6 HOURS PRN
Status: COMPLETED | OUTPATIENT
Start: 2023-02-10 | End: 2023-02-10

## 2023-02-10 RX ORDER — SODIUM CHLORIDE 9 MG/ML
25 INJECTION, SOLUTION INTRAVENOUS PRN
Status: DISCONTINUED | OUTPATIENT
Start: 2023-02-10 | End: 2023-02-10 | Stop reason: HOSPADM

## 2023-02-10 RX ORDER — BUPIVACAINE HYDROCHLORIDE 2.5 MG/ML
INJECTION, SOLUTION EPIDURAL; INFILTRATION; INTRACAUDAL PRN
Status: DISCONTINUED | OUTPATIENT
Start: 2023-02-10 | End: 2023-02-10 | Stop reason: ALTCHOICE

## 2023-02-10 RX ORDER — SODIUM CHLORIDE, SODIUM LACTATE, POTASSIUM CHLORIDE, CALCIUM CHLORIDE 600; 310; 30; 20 MG/100ML; MG/100ML; MG/100ML; MG/100ML
INJECTION, SOLUTION INTRAVENOUS CONTINUOUS PRN
Status: DISCONTINUED | OUTPATIENT
Start: 2023-02-10 | End: 2023-02-10 | Stop reason: SDUPTHER

## 2023-02-10 RX ADMIN — HYDROCODONE BITARTRATE AND ACETAMINOPHEN 1 TABLET: 5; 325 TABLET ORAL at 16:35

## 2023-02-10 RX ADMIN — LIDOCAINE HYDROCHLORIDE 1 ML: 10 INJECTION, SOLUTION EPIDURAL; INFILTRATION; INTRACAUDAL; PERINEURAL at 10:53

## 2023-02-10 RX ADMIN — METOCLOPRAMIDE 10 MG: 5 INJECTION, SOLUTION INTRAMUSCULAR; INTRAVENOUS at 16:57

## 2023-02-10 RX ADMIN — GABAPENTIN 300 MG: 300 CAPSULE ORAL at 10:53

## 2023-02-10 RX ADMIN — FENTANYL CITRATE 50 MCG: 50 INJECTION, SOLUTION INTRAMUSCULAR; INTRAVENOUS at 13:19

## 2023-02-10 RX ADMIN — HYDROMORPHONE HYDROCHLORIDE 0.5 MG: 1 INJECTION, SOLUTION INTRAMUSCULAR; INTRAVENOUS; SUBCUTANEOUS at 15:52

## 2023-02-10 RX ADMIN — ONDANSETRON 4 MG: 2 INJECTION INTRAMUSCULAR; INTRAVENOUS at 15:52

## 2023-02-10 RX ADMIN — ROCURONIUM BROMIDE 30 MG: 10 INJECTION, SOLUTION INTRAVENOUS at 13:28

## 2023-02-10 RX ADMIN — ACETAMINOPHEN 1000 MG: 500 TABLET ORAL at 10:53

## 2023-02-10 RX ADMIN — SODIUM CHLORIDE, POTASSIUM CHLORIDE, SODIUM LACTATE AND CALCIUM CHLORIDE: 600; 310; 30; 20 INJECTION, SOLUTION INTRAVENOUS at 11:14

## 2023-02-10 RX ADMIN — SODIUM CHLORIDE, POTASSIUM CHLORIDE, SODIUM LACTATE AND CALCIUM CHLORIDE: 600; 310; 30; 20 INJECTION, SOLUTION INTRAVENOUS at 13:10

## 2023-02-10 RX ADMIN — ROCURONIUM BROMIDE 10 MG: 10 INJECTION, SOLUTION INTRAVENOUS at 14:42

## 2023-02-10 RX ADMIN — Medication 2000 MG: at 13:27

## 2023-02-10 RX ADMIN — EPHEDRINE SULFATE 10 MG: 50 INJECTION INTRAMUSCULAR; INTRAVENOUS; SUBCUTANEOUS at 13:54

## 2023-02-10 RX ADMIN — MIDAZOLAM 2 MG: 1 INJECTION INTRAMUSCULAR; INTRAVENOUS at 13:12

## 2023-02-10 RX ADMIN — Medication 140 MG: at 13:19

## 2023-02-10 RX ADMIN — FENTANYL CITRATE 50 MCG: 50 INJECTION, SOLUTION INTRAMUSCULAR; INTRAVENOUS at 13:15

## 2023-02-10 RX ADMIN — HYDROMORPHONE HYDROCHLORIDE 0.5 MG: 1 INJECTION, SOLUTION INTRAMUSCULAR; INTRAVENOUS; SUBCUTANEOUS at 16:01

## 2023-02-10 RX ADMIN — SODIUM CHLORIDE, POTASSIUM CHLORIDE, SODIUM LACTATE AND CALCIUM CHLORIDE: 600; 310; 30; 20 INJECTION, SOLUTION INTRAVENOUS at 13:50

## 2023-02-10 RX ADMIN — PROPOFOL 200 MG: 10 INJECTION, EMULSION INTRAVENOUS at 13:19

## 2023-02-10 ASSESSMENT — PAIN DESCRIPTION - DESCRIPTORS
DESCRIPTORS: SHARP
DESCRIPTORS: SORE

## 2023-02-10 ASSESSMENT — PAIN SCALES - GENERAL
PAINLEVEL_OUTOF10: 9
PAINLEVEL_OUTOF10: 9
PAINLEVEL_OUTOF10: 6
PAINLEVEL_OUTOF10: 9
PAINLEVEL_OUTOF10: 9
PAINLEVEL_OUTOF10: 5
PAINLEVEL_OUTOF10: 6

## 2023-02-10 ASSESSMENT — PAIN DESCRIPTION - LOCATION
LOCATION: ABDOMEN
LOCATION: ABDOMEN

## 2023-02-10 ASSESSMENT — PAIN - FUNCTIONAL ASSESSMENT: PAIN_FUNCTIONAL_ASSESSMENT: NONE - DENIES PAIN

## 2023-02-10 ASSESSMENT — PAIN DESCRIPTION - ORIENTATION
ORIENTATION: MID
ORIENTATION: LOWER

## 2023-02-10 ASSESSMENT — PAIN DESCRIPTION - ONSET
ONSET: AWAKENED FROM SLEEP
ONSET: AWAKENED FROM SLEEP

## 2023-02-10 ASSESSMENT — PAIN DESCRIPTION - PAIN TYPE
TYPE: SURGICAL PAIN
TYPE: SURGICAL PAIN

## 2023-02-10 NOTE — ANESTHESIA PRE PROCEDURE
Department of Anesthesiology  Preprocedure Note       Name:  Loyd Hampton   Age:  60 y.o.  :  1960                                          MRN:  [id number]         Date:  2/10/2023      Surgeon: Juan A Warner):  Francine Gerber DO    Procedure: Procedure(s):  LAPAROSCOPIC ROBOTIC XI RIGHT INGUINAL HERNIA REPAIR W/MESH, POSSIBLE OPEN, POSSIBLE BILATERAL, ALL INDICATED PROCEDURES    Medications prior to admission:   Prior to Admission medications    Medication Sig Start Date End Date Taking?  Authorizing Provider   tadalafil (CIALIS) 5 MG tablet Take 5 mg by mouth as needed for Erectile Dysfunction    Historical Provider, MD       Current medications:    Current Facility-Administered Medications   Medication Dose Route Frequency Provider Last Rate Last Admin    sodium chloride flush 0.9 % injection 5-40 mL  5-40 mL IntraVENous 2 times per day Dario Sandoval MD        sodium chloride flush 0.9 % injection 5-40 mL  5-40 mL IntraVENous PRN Nara Hays MD        0.9 % sodium chloride infusion   IntraVENous PRN Dario Sandoval MD        lactated ringers IV soln infusion   IntraVENous Continuous Nara Hays MD        sodium chloride flush 0.9 % injection 5-40 mL  5-40 mL IntraVENous 2 times per day Nazia Fabian, DO        sodium chloride flush 0.9 % injection 5-40 mL  5-40 mL IntraVENous PRN Nazia Fabian, DO        0.9 % sodium chloride infusion   IntraVENous PRN Nazia Fabian, DO        lactated ringers IV soln infusion   IntraVENous Continuous Nazia Fabian,  mL/hr at 02/10/23 1114 New Bag at 02/10/23 1114    ceFAZolin (ANCEF) 2000 mg in 0.9% sodium chloride 50 mL IVPB  2,000 mg IntraVENous On Call to OR Nazia Fabian DO           Allergies:  No Known Allergies    Problem List:    Patient Active Problem List   Diagnosis Code    Prostate cancer ([facility] [address] C61    Dysuria R30.0    Elevated blood pressure reading R03.0    Acute left-sided thoracic back pain M54.6    Non-recurrent unilateral inguinal hernia without obstruction or gangrene K40.90    Pre-operative clearance Z01.818       Past Medical History:        Diagnosis Date    Acute left-sided thoracic back pain 02/07/2018    Cancer [facility])      with radiation and seeds, prostate    Macular degeneration     Left eye       Past Surgical History:        Procedure Laterality Date    COLONOSCOPY         Social History:    Social History     Tobacco Use    Smoking status: Never    Smokeless tobacco: Never   Substance Use Topics    Alcohol use:  No                                Counseling given: Not Answered      Vital Signs (Current):   Vitals:    02/10/23 1045   BP: (!) 148/95   Pulse: 95   Resp: 20   Temp: 97.1 °F (36.2 °C)   TempSrc: Infrared   SpO2: 99%                                              BP Readings from Last 3 Encounters:   02/10/23 (!) 148/95   02/01/23 (!) 135/100   01/30/23 (!) 138/91       NPO Status: Time of last liquid consumption: 2100                        Time of last solid consumption: 2100                        Date of last liquid consumption: 02/09/23                        Date of last solid food consumption: 02/09/23    BMI:   Wt Readings from Last 3 Encounters:   02/01/23 213 lb (96.6 kg)   01/30/23 213 lb (96.6 kg)   01/10/23 214 lb (97.1 kg)     There is no height or weight on file to calculate BMI.    CBC:   Lab Results   Component Value Date/Time    WBC 6.9 01/13/2023 12:08 PM    RBC 5.64 01/13/2023 12:08 PM    HGB 16.3 01/13/2023 12:08 PM    HCT 48.7 01/13/2023 12:08 PM    MCV 86.3 01/13/2023 12:08 PM    RDW 14.3 01/13/2023 12:08 PM     01/13/2023 12:08 PM       CMP:   Lab Results   Component Value Date/Time     01/13/2023 12:08 PM    K 4.3 01/13/2023 12:08 PM     01/13/2023 12:08 PM    CO2 29 01/13/2023 12:08 PM    BUN 17 01/13/2023 12:08 PM    CREATININE 1.19 01/13/2023 12:08 PM    GFRAA >60 11/20/2015 09:40 AM    LABGLOM >60 01/13/2023 12:08 PM    GLUCOSE 107 01/13/2023 12:08 PM    CALCIUM 9.1 01/13/2023 12:08 PM       POC Tests: No results for input(s): POCGLU, POCNA, POCK, POCCL, POCBUN, POCHEMO, POCHCT in the last 72 hours. Coags:   Lab Results   Component Value Date/Time    PROTIME 11.7 03/14/2013 02:06 PM    INR 1.1 03/14/2013 02:06 PM    APTT 26.8 03/14/2013 02:06 PM       HCG (If Applicable): No results found for: PREGTESTUR, PREGSERUM, HCG, HCGQUANT     ABGs: No results found for: PHART, PO2ART, VXD4ORM, FDD2ZAZ, BEART, U1TIRVOL     Type & Screen (If Applicable):  No results found for: LABABO, LABRH    Drug/Infectious Status (If Applicable):  No results found for: HIV, HEPCAB    COVID-19 Screening (If Applicable): No results found for: COVID19        Anesthesia Evaluation  Patient summary reviewed and Nursing notes reviewed no history of anesthetic complications:   Airway: Mallampati: II  TM distance: >3 FB   Neck ROM: full  Mouth opening: > = 3 FB   Dental:          Pulmonary:Negative Pulmonary ROS and normal exam  breath sounds clear to auscultation                             Cardiovascular:  Exercise tolerance: good (>4 METS),         ECG reviewed  Rhythm: regular  Rate: normal                 ROS comment: Elevated blood pressure     Neuro/Psych:   Negative Neuro/Psych ROS              GI/Hepatic/Renal: Neg GI/Hepatic/Renal ROS            Endo/Other:    (+) malignancy/cancer (prostate). ROS comment: Left eye macular degeneration  Right inguinal hernia Abdominal:             Vascular: Other Findings:           Anesthesia Plan      general     ASA 2       Induction: intravenous. MIPS: Postoperative opioids intended and Prophylactic antiemetics administered. Anesthetic plan and risks discussed with patient. Plan discussed with CRNA.                     Debi Kern MD   2/10/2023

## 2023-02-10 NOTE — DISCHARGE INSTRUCTIONS
Post Operative Inguinal Hernia Discharge Instructions    WOUND CARE:   You have glue and steri-strips overlying your incisions, with dissolvable sutures. Allow the glue and steri-strips to fall off naturally. If they begin to peel, it is ok to trim the the steri-strips. If the glue and steri-strips remain intact at 2 weeks after your surgery, it is ok to remove at that time. Do not submerge your incisions in water (baths, pools, hot tubs, etc) for 2 weeks. BATHING:    Ok to shower 24 hrs after your operation. Wash incisions gently with soap and allow water to wash down over the incisions. Do not submerge surgical incisions in water (baths, pools, hot tubs, lakes) for 2 weeks. DRIVING:   You may return to driving when you are NOT taking narcotic pain medications and your pain is minimal enough where you can safely slam on the breaks or turn suddenly if you needed to. For most patients take 10-14 days before they feel comfortable enough they could move quickly if needed. ACTIVITY:  No pushing, pulling, or lifting more than 10 lbs for 2 weeks. If a work note is needed, please call the surgery office or discuss at your first post operative visit  No strenuous exercise for 6 weeks - including weight lifting, sprinting, plyometric exercises for 6 weeks. Listen to your body -- if you are experiencing pain - refrain from such activity, except for walking. You should be walking daily to prevent blood clots in your veins. When your pain is resolving it is ok to:  Walk, climb stairs, ride a STATIONARY bike    LIFTING:   No lifting greater than 10 lbs for 2 weeks    DIET:   [address] to resume regular diet. Start slow with non greasy and fatty foods. Some experience nausea and vomiting after anesthesia - which may last 24 hours. MEDICATIONS:     Take medications as prescribed. Constipation Medications  It is important for you to take stool softeners (Doculax, Senna, Senakot, etc) as prescribed.  It is important for you to have soft bowel movements (poop) without straining and baring down to protect your hernia repair. If you have gone 2-3 days without a bowel movement, begin taking 17 g or 1 cap full of Miralax daily to achieve soft stool without straining. Pain Medications:  Schedule Tylenol 1,000 mg every 8 hours (3 times daily)  Schedule Motrin 400-600 mg every 6 hours (4 times daily)  Do not take this medication if you have kidney problems  Narcotics will be prescribed for you for 5-7 days post operatively - unless discussed pre-operatively. Taken the narcotic medications as prescribed and wean as quickly as possible. Most patients require less than prescribed  You underwent surgery and will experience pain at the incision sites and the site of the hernia. This is normal. The pain will improve over the first 7-10 days with the expectation it may take a full year before you feel back to your baseline self. Work to wean of narcotic medications as quickly as able. These medications have the risk of addiction  Anticoagulation (blood thinners)  Ok to continue or restart Aspirin   Restart other anticoagulation -- (Coumadin, Eliquis, Plavix, etc) in 48 hours after surgery. If you have questions on what medications are ok to take -- ask your doctor. FOLLOW UP:   Call and schedule post operative follow up with Dr. Brittanie Hodges in 2 weeks - call sooner if there are questions or concerns. WHEN TO CALL:  If you experience a fever of 101 or greater  If you notice redness around your incision which is increasing in size  If you notice bloody, green, or brown drainage from your incision  If you experience difficulty urinating  If your abdominal pain is worsening or to a point where you are unable to walk and do mild daily activities  If you are having episodes of vomiting and unable to stay hydrated      SPECIAL INSTRUCTIONS:     Apply compression over the groin and scrotum for 1-2 weeks to minimize swelling.

## 2023-02-10 NOTE — INTERVAL H&P NOTE
Update History & Physical    The patient's History and Physical of   February 1, 2023     Patient will be having : Procedure(s):  LAPAROSCOPIC ROBOTIC XI RIGHT INGUINAL HERNIA REPAIR W/MESH, POSSIBLE OPEN, POSSIBLE BILATERAL, ALL INDICATED PROCEDURES  The surgical site was confirmed by the  patient and me. There was no change. Or updates at this time. Patient did require clearance. Patient has been NPO since midnight. No blood thinners in the past 5-7 days. Patient denies any personal or family problems with anesthesia. Patient was physically assessed, including cardiovascular and respiratory. Patient understands and wants to proceed with the procedure.      Electronically signed by SACHA Long CNP on 2/10/2023 at 10:29 AM

## 2023-02-10 NOTE — ANESTHESIA POSTPROCEDURE EVALUATION
POST- ANESTHESIA EVALUATION       Pt Name: Yo Forde  MRN: [id number]  YOB: 1960  Date of evaluation: 2/10/2023  Time:  5:13 PM      BP (!) 150/86   Pulse 95   Temp 99.3 °F (37.4 °C) (Infrared)   Resp 14   SpO2 100%      Consciousness Level  Awake  Cardiopulmonary Status  Stable  Pain Adequately Treated YES  Nausea / Vomiting  NO  Adequate Hydration  YES  Anesthesia Related Complications NONE      Electronically signed by Amy Leblanc MD on 2/10/2023 at 5:13 PM       Department of Anesthesiology  Postprocedure Note    Patient: Yo Forde  MRN: [id number]  YOB: 1960  Date of evaluation: 2/10/2023      Procedure Summary     Date: 02/10/23 Room / Location: [address] [place] [address]: [facility]    Anesthesia Start: 1314 Anesthesia Stop: 1539    Procedure: LAPAROSCOPIC ROBOTIC XI RIGHT INGUINAL HERNIA REPAIR W/MESH, (Right: Groin) Diagnosis:       Right inguinal hernia      (RIGHT INGUINAL HERNIA)    Surgeons: Natalie Landaverde DO Responsible Provider: Amy Leblanc MD    Anesthesia Type: general ASA Status: 2          Anesthesia Type: No value filed.     Kiarra Phase I: Kiarra Score: 10    Kiarra Phase II: Kiarra Score: 10      Anesthesia Post Evaluation

## 2023-02-11 NOTE — OP NOTE
Operative Note      Patient: Neal Monk  YOB: 1960  MRN: [id number]    Date of Procedure: 2/10/2023    Pre-Op Diagnosis: Right inguinal hernia    Post-Op Diagnosis: Right direct inguinal hernia       Procedure(s):  Robotic assisted laparoscopic right inguinal hernia repair with mesh  Bilateral tap block    Surgeon(s):  Carly Murphy DO    Assistant:   Christina Hines    Anesthesia: General    Estimated Blood Loss (mL): 10    Complications: None    Specimens:  None    Implants:  Implant Name Type Inv. Item Serial No.  Lot No. LRB No. Used Action   MESH RADHA [id number] LAP MFIL POLY TEREPHTHALATE MFIL - [id number]  MESH RADHA [id number] LAP MFIL POLY TEREPHTHALATE MFIL  [employer]  SURGICAL-WD [id number] N/A 1 Implanted         Drains: None    Findings: David Crane had a 2 cm direct inguinal hernia defect containing small intestine. Indications: David Crane is a 76-year-old male who was seen in outpatient office for evaluation of a right inguinal hernia defect with small bowel that was confirmed on CT scan. He had noticed a bulge in his right groin several months prior to being evaluated. He did have a history of prostate cancer. Options of surgical and conservative management were offered to the patient and after risk versus benefits and alternatives, the patient requested to proceed to the operating room for right inguinal hernia defect repair. Detailed Description of Procedure:   David Crane was brought to the operating room and placed on the OR table in supine position. General anesthesia was induced by the anesthesia team.  A formal timeout identifying the patient, procedure, allergies, and antibiotics was performed. The patient was given 2 g Ancef prior to incision. A Hoang catheter was placed using sterile technique. The abdomen was clipped, prepped with ChloraPrep, and draped in the usual sterile fashion.     An 8 mm incision was made off midline 20 cm superior to the right inguinal hernia defect. Veress needle was inserted into the abdomen and confirmed by aspiration and saline drop test.  The abdomen was insufflated to 15 mmHg of CO2. The Veress needle was removed and an 8 mm robotic port was placed using [employer]. No evidence of bowel injury or mesenteric defect noted inferior to this port placement. 2 additional 8 mm ports were placed on either side 8 cm apart under direct visualization. The patient was placed in a Trendelenburg position and the robot was docked. On evaluation of the anatomy there was an obvious right inguinal hernia defect and no left inguinal hernia defect on exam.  Small intestine was reduced from the hernia defect. The peritoneum was scored starting lateral to the umbilical fold and carried out laterally to the ASIS. The peritoneal flap was developed medially using electrocautery and blunt dissection to Deion's ligament and the pubic bone were cleared off and identified. The peritoneal flap was created and mobilized medially until the hernia sac was noted going into the inguinal canal.  The transition between the rectus and transversalis muscle fascia was taken down using electrocautery. The anterior portion of the hernia sac was gently grasped and carried medially. Blunt dissection electrocautery was used to tease off the lateral attachments until the vas deferens and testicular vessels were completely identified and preserved. There was then it was noted that this hernia was a direct hernia, and gentle traction of the peritoneum was continued until the hernia sac was completely reduced from the direct defect along with a lipoma. The peritoneal flap was continued to be mobilized cephalad and off of the critical structures to create an adequate pocket for the mesh. There was no femoral defect or indirect hernia defect.   The hernia defect was approximated with 2-0 Ethibond sutures in simple interrupted fashion careful to preserve the epigastric vessels as they were in close proximity. A 12 x 16 cm ProGrip mesh was inserted into the surgical field followed by a 2-0 V-Loc suture. The mesh was placed over the hernia defect and gently opened and secured to the abdominal wall ensuring adequate 2 cm coverage in all directions of the hernia defect in the inguinal canal.  The peritoneal flap was closed with a running 2 0 V-Loc suture. There was a small peritoneal defect inferiorly that was approximated with a 3 0 V-Loc suture in a running fashion. The robot was undocked. Bilateral tap blocks were performed under direct visualization utilizing 30 mL of 0.25% Marcaine on each side via an 18-gauge spinal needle. Insufflation was evacuated. The ports were removed. The skin was approximated with 4-0 Monocryl in simple interrupted deep dermal fashion. Dermabond and Steri-Strips were placed over the skin incisions for surgical dressing. This concluded the procedure. The patient tolerated well without immediate complications. All sponge, needles, instrument counts were correct at the end of the case. The Hoang was removed. The patient was awoken in the operating room, extubated, transferred to PACU in stable condition.   All    Electronically signed by Amrik Arevalo DO on 2/10/2023 at 8:42 PM

## 2023-02-20 ENCOUNTER — OFFICE VISIT (OUTPATIENT)
Dept: SURGERY | Age: 63
End: 2023-02-20

## 2023-02-20 VITALS
BODY MASS INDEX: 28.77 KG/M2 | DIASTOLIC BLOOD PRESSURE: 96 MMHG | HEART RATE: 89 BPM | TEMPERATURE: 97.8 F | RESPIRATION RATE: 16 BRPM | SYSTOLIC BLOOD PRESSURE: 136 MMHG | WEIGHT: 209.2 LBS

## 2023-02-20 DIAGNOSIS — K40.90 NON-RECURRENT UNILATERAL INGUINAL HERNIA WITHOUT OBSTRUCTION OR GANGRENE: Primary | ICD-10-CM

## 2023-02-20 PROCEDURE — 99024 POSTOP FOLLOW-UP VISIT: CPT | Performed by: STUDENT IN AN ORGANIZED HEALTH CARE EDUCATION/TRAINING PROGRAM

## 2023-02-20 NOTE — PROGRESS NOTES
Office Note -- Post op visit      Patient: Mary Jane Scales  MRN: [id number]  YOB: 1960 (60 y.o.)    Date of Office Visit: February 20, 2023     CC: Post op follow up    SUBJECTIVE:  Mary Jane Scales is a 60 y.o. male who returns to the [facility] surgery clinic for post op follow up from robotic assisted laparoscopic right direct and indirect inguinal hernia repair with mesh on 2/10/2023. Eating a regular diet without difficulty. Patient did have difficulties having bowel movements acutely postoperatively, but resolved with apple and prune juice. He still remains to have pain in the groin region, but it is improving daily. He is not taking narcotics at this point. He is taking Tylenol and ibuprofen. He denies noticing a bulge in his groin region. Review of Systems:  GEN: Denies fevers, chills. CV: Denies chest pain. RESP: Denies shortness of breath, COPD, asthma. GI: As per HPI  : Denies increased frequency or dysuria. SKIN: Denies skin lesions or incisional dehiscence. Physical Exam:    Vitals:    02/20/23 1125   BP: (!) 136/96   Pulse: 89   Resp: 16   Temp: 97.8 °F (36.6 °C)       General: Alert and oriented x 3. Non toxic in appearance. No apparent distress. Heart: Regular rate and rhythm. Lungs: symmetrical chest rise bilaterally, no respiratory distress. Clear breath sounds bilaterally. Abdomen: Soft, nondistended, tender to palpation in the right inguinal and groin region just lateral to the pubic bone. No hernia identified. No scrotal swelling. Skin: No rashes or nodules noted. Incisions clean, dry and intact, healing well. Assessment:  Right inguinal hernia repair    Plan:  Discussed no lifting, pushing, pulling more than 10 pounds for an additional 2 weeks due to continued pain and history of primary direct hernia repair. Thereafter, restrictions include no performing activities that cause pain to the abdominal or groin incisions.   Okay to drive  May take Steri-Strips and skin glue off and shower  Follow-up as needed      Shweta Fabian, DO  2/20/2023

## 2023-03-03 PROBLEM — Z01.818 PRE-OPERATIVE CLEARANCE: Status: RESOLVED | Noted: 2023-02-01 | Resolved: 2023-03-03

## 2023-10-17 ENCOUNTER — HOSPITAL ENCOUNTER (OUTPATIENT)
Age: 63
Discharge: HOME | End: 2023-10-17
Payer: COMMERCIAL

## 2023-10-17 LAB — PSA SERPL-MCNC: <0.02 NG/ML

## 2023-10-17 PROCEDURE — 36415 COLL VENOUS BLD VENIPUNCTURE: CPT

## 2023-10-17 PROCEDURE — 84153 ASSAY OF PSA TOTAL: CPT

## 2024-09-30 SDOH — HEALTH STABILITY: PHYSICAL HEALTH: ON AVERAGE, HOW MANY DAYS PER WEEK DO YOU ENGAGE IN MODERATE TO STRENUOUS EXERCISE (LIKE A BRISK WALK)?: 1 DAY

## 2024-09-30 SDOH — HEALTH STABILITY: PHYSICAL HEALTH: ON AVERAGE, HOW MANY MINUTES DO YOU ENGAGE IN EXERCISE AT THIS LEVEL?: 20 MIN

## 2024-10-02 ENCOUNTER — OFFICE VISIT (OUTPATIENT)
Dept: FAMILY MEDICINE CLINIC | Age: 64
End: 2024-10-02

## 2024-10-02 VITALS
SYSTOLIC BLOOD PRESSURE: 139 MMHG | HEIGHT: 71 IN | HEART RATE: 90 BPM | DIASTOLIC BLOOD PRESSURE: 89 MMHG | BODY MASS INDEX: 28.9 KG/M2 | TEMPERATURE: 97.2 F | WEIGHT: 206.4 LBS | OXYGEN SATURATION: 100 %

## 2024-10-02 DIAGNOSIS — Z76.89 ENCOUNTER TO ESTABLISH CARE WITH NEW DOCTOR: Primary | ICD-10-CM

## 2024-10-02 DIAGNOSIS — Z13.220 ENCOUNTER FOR LIPID SCREENING FOR CARDIOVASCULAR DISEASE: ICD-10-CM

## 2024-10-02 DIAGNOSIS — Z13.1 DIABETES MELLITUS SCREENING: ICD-10-CM

## 2024-10-02 DIAGNOSIS — R03.0 ELEVATED BLOOD PRESSURE READING: ICD-10-CM

## 2024-10-02 DIAGNOSIS — R25.2 MUSCLE CRAMP: ICD-10-CM

## 2024-10-02 DIAGNOSIS — Z13.6 ENCOUNTER FOR LIPID SCREENING FOR CARDIOVASCULAR DISEASE: ICD-10-CM

## 2024-10-02 DIAGNOSIS — Z23 NEED FOR SHINGLES VACCINE: ICD-10-CM

## 2024-10-02 RX ORDER — ZOSTER VACCINE RECOMBINANT, ADJUVANTED 50 MCG/0.5
0.5 KIT INTRAMUSCULAR ONCE
Qty: 0.5 ML | Refills: 1 | Status: SHIPPED | OUTPATIENT
Start: 2024-10-02 | End: 2024-10-02

## 2024-10-02 SDOH — ECONOMIC STABILITY: FOOD INSECURITY: WITHIN THE PAST 12 MONTHS, YOU WORRIED THAT YOUR FOOD WOULD RUN OUT BEFORE YOU GOT MONEY TO BUY MORE.: NEVER TRUE

## 2024-10-02 SDOH — ECONOMIC STABILITY: FOOD INSECURITY: WITHIN THE PAST 12 MONTHS, THE FOOD YOU BOUGHT JUST DIDN'T LAST AND YOU DIDN'T HAVE MONEY TO GET MORE.: NEVER TRUE

## 2024-10-02 SDOH — ECONOMIC STABILITY: INCOME INSECURITY: HOW HARD IS IT FOR YOU TO PAY FOR THE VERY BASICS LIKE FOOD, HOUSING, MEDICAL CARE, AND HEATING?: NOT HARD AT ALL

## 2024-10-02 ASSESSMENT — PATIENT HEALTH QUESTIONNAIRE - PHQ9
SUM OF ALL RESPONSES TO PHQ QUESTIONS 1-9: 0
SUM OF ALL RESPONSES TO PHQ QUESTIONS 1-9: 0
SUM OF ALL RESPONSES TO PHQ9 QUESTIONS 1 & 2: 0
2. FEELING DOWN, DEPRESSED OR HOPELESS: NOT AT ALL
1. LITTLE INTEREST OR PLEASURE IN DOING THINGS: NOT AT ALL
SUM OF ALL RESPONSES TO PHQ QUESTIONS 1-9: 0
SUM OF ALL RESPONSES TO PHQ QUESTIONS 1-9: 0

## 2024-10-02 NOTE — PROGRESS NOTES
10/2/2024    César Crawley (:  1960) is a 64 y.o. male, coming in today to establish care.      Subjective   Patient Active Problem List   Diagnosis    Prostate cancer (HCC)    Dysuria    Elevated blood pressure reading    Acute left-sided thoracic back pain     H/o prostate cancer 2017 - radiation and seed implant.  Has been following up with the urologist yearly.    Complains about cramps in hands and feet - for over one year. When standing or using his hands a lot.  He denies any numbness just even driving a lot he gets cramping in his hands.  Complains about shooting pain in bottom of feet. More so at night - 3 -4 times during the months - 4-5 months ago.    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  Is on the road a lot.    . 3 kids. 7 grand kids.    No violence at the current living place.  No concerns about sexual transmitted diseases.  Tobacco use: none  Alcohol use:none  Other drug use: none  Depressed: not depressed    Mom 88 yo - h/o stroke, bedridden.   Dad 90 yo - h/o prostate cancer, colon cancer, MI. And more.  Siblings: 6 - well.    Vaccinations: needs vaccines.  Colonoscopy: due .    Review of Systems  Pertinent items are noted in HPI.    Prior to Visit Medications    Medication Sig Taking? Authorizing Provider   Multiple Vitamins-Minerals (PRESERVISION AREDS 2 PO) Take by mouth Yes ProviderJhony MD   zoster recombinant adjuvanted vaccine (SHINGRIX) 50 MCG/0.5ML SUSR injection Inject 0.5 mLs into the muscle once for 1 dose Repeat in 2-6 monhts Yes Lilian Oneil MD   tadalafil (CIALIS) 5 MG tablet Take 1 tablet by mouth as needed for Erectile Dysfunction Yes ProviderJhony MD   ondansetron (ZOFRAN) 4 MG tablet Take 1 tablet by mouth 3 times daily as needed for Nausea or Vomiting  Patient not taking: Reported on 2023  Nazia Fabian DO        No Known Allergies    Past Medical History:   Diagnosis Date    Acute left-sided thoracic back pain 2018    Cancer

## 2024-10-03 LAB
ALBUMIN: 3.9 G/DL
ALBUMIN: NORMAL
ALK PHOSPHATASE: 57 U/L
ALP BLD-CCNC: NORMAL U/L
ALT SERPL-CCNC: 23 U/L
ALT SERPL-CCNC: NORMAL U/L
ANION GAP SERPL CALCULATED.3IONS-SCNC: NORMAL MMOL/L
AST SERPL-CCNC: 38 U/L
AST SERPL-CCNC: NORMAL U/L
BASOPHILS ABSOLUTE: 0.03 X10^3UL
BASOPHILS ABSOLUTE: NORMAL
BASOPHILS RELATIVE PERCENT: 0.4 %
BASOPHILS RELATIVE PERCENT: NORMAL
BILIRUB SERPL-MCNC: 1.9 MG/DL
BILIRUB SERPL-MCNC: NORMAL MG/DL
BUN BLDV-MCNC: 20 MG/DL
BUN BLDV-MCNC: NORMAL MG/DL
CALCIUM SERPL-MCNC: 9 MG/DL
CALCIUM SERPL-MCNC: NORMAL MG/DL
CHLORIDE BLD-SCNC: 108 MMOL/L
CHLORIDE BLD-SCNC: NORMAL MMOL/L
CHOLESTEROL, TOTAL: 161 MG/DL
CHOLESTEROL, TOTAL: NORMAL
CHOLESTEROL/HDL RATIO: 3.4
CHOLESTEROL/HDL RATIO: NORMAL
CO2: 25 MMOL/L
CO2: NORMAL
CREAT SERPL-MCNC: 1.12 MG/DL
CREAT SERPL-MCNC: NORMAL MG/DL
EOSINOPHILS ABSOLUTE: 0.11 X10^3UL
EOSINOPHILS ABSOLUTE: NORMAL
EOSINOPHILS RELATIVE PERCENT: 1.5 %
EOSINOPHILS RELATIVE PERCENT: NORMAL
ERYTHROCYTE [DISTWIDTH] IN BLOOD BY AUTOMATED COUNT: 44.3 FL
FREE T4 REFLEX: NO
GFR AFRICAN AMERICAN: 79.6 ML/M1.7
GFR NON-AFRICAN AMERICAN: 65.8 ML/M1.7
GFR, ESTIMATED: NORMAL
GLUCOSE BLD-MCNC: NORMAL MG/DL
GLUCOSE: 93 MG/DL
HCT VFR BLD CALC: 48.4 %
HCT VFR BLD CALC: NORMAL %
HDLC SERPL-MCNC: 47 MG/DL
HDLC SERPL-MCNC: NORMAL MG/DL
HEMOGLOBIN: 16.5 G/DL
HEMOGLOBIN: NORMAL
IMMATURE GRANULOCYTES %: 0.3 %
IMMATURE GRANULOCYTES ABSOLUTE: 0.02 X10^3UL
LDL CHOLESTEROL: 99 MG/DL
LDL CHOLESTEROL: NORMAL
LYMPHOCYTES ABSOLUTE: 3.01 X10^3UL
LYMPHOCYTES ABSOLUTE: NORMAL
LYMPHOCYTES RELATIVE PERCENT: 41 %
LYMPHOCYTES RELATIVE PERCENT: NORMAL
MAGNESIUM: 2.1 MG/DL
MAGNESIUM: NORMAL
MCH RBC QN AUTO: 29.5 PG
MCH RBC QN AUTO: NORMAL PG
MCHC RBC AUTO-ENTMCNC: 34.1 G/DL
MCHC RBC AUTO-ENTMCNC: NORMAL G/DL
MCV RBC AUTO: 86.6 FL
MCV RBC AUTO: NORMAL FL
MONOCYTES ABSOLUTE: 0.68 X10^3UL
MONOCYTES ABSOLUTE: NORMAL
MONOCYTES RELATIVE PERCENT: 9.3 %
MONOCYTES RELATIVE PERCENT: NORMAL
NEUTROPHILS ABSOLUTE: 3.49 X10^3UL
NEUTROPHILS ABSOLUTE: NORMAL
NEUTROPHILS RELATIVE PERCENT: 47.5 %
NEUTROPHILS RELATIVE PERCENT: NORMAL
NONHDLC SERPL-MCNC: NORMAL MG/DL
PDW BLD-RTO: NORMAL %
PLATELET # BLD: 166 X10^3UL
PLATELET # BLD: NORMAL 10*3/UL
PMV BLD AUTO: NORMAL FL
POTASSIUM SERPL-SCNC: 4.6 MMOL/L
POTASSIUM SERPL-SCNC: NORMAL MMOL/L
RBC # BLD: 5.59 X10^6UL
RBC # BLD: NORMAL 10*6/UL
SODIUM BLD-SCNC: 138 MMOL/L
SODIUM BLD-SCNC: NORMAL MMOL/L
TOTAL PROTEIN: 6.8 G/DL
TOTAL PROTEIN: NORMAL
TRIGL SERPL-MCNC: 74 MG/DL
TRIGL SERPL-MCNC: NORMAL MG/DL
TSH SERPL DL<=0.05 MIU/L-ACNC: 2.4 MIU/L
TSH SERPL DL<=0.05 MIU/L-ACNC: NORMAL M[IU]/L
VLDLC SERPL CALC-MCNC: 15 MG/DL
VLDLC SERPL CALC-MCNC: NORMAL MG/DL
WBC # BLD: 7.35 X10^3UL
WBC # BLD: NORMAL 10*3/UL

## 2024-10-04 DIAGNOSIS — Z13.1 DIABETES MELLITUS SCREENING: ICD-10-CM

## 2024-10-04 DIAGNOSIS — R03.0 ELEVATED BLOOD PRESSURE READING: ICD-10-CM

## 2024-10-04 DIAGNOSIS — Z13.220 ENCOUNTER FOR LIPID SCREENING FOR CARDIOVASCULAR DISEASE: ICD-10-CM

## 2024-10-04 DIAGNOSIS — Z13.6 ENCOUNTER FOR LIPID SCREENING FOR CARDIOVASCULAR DISEASE: ICD-10-CM

## 2024-10-04 DIAGNOSIS — R25.2 MUSCLE CRAMP: ICD-10-CM

## 2024-10-04 LAB
ESTIMATED AVERAGE GLUCOSE: NORMAL
HBA1C MFR BLD: 4.7 %

## 2024-10-21 LAB — PROSTATE SPECIFIC ANTIGEN: <0.06 NG/ML

## 2025-02-07 NOTE — H&P (VIEW-ONLY)
HISTORY and [facility]red[facility]ms [id number]       NAME:  Sayra Gardiner  MRN: [id number]   YOB: 1960   Date: 2023   Age: 60 y.o. Gender: male       COMPLAINT AND PRESENT HISTORY:       Sayra Gardiner is 60 y.o.,  male, Preadmission Testing for : RIGHT INGUINAL HERNIA. Patient will be havin [facility] W/MESH, POSSIBLE OPEN, POSSIBLE BILATERAL, ALL INDICATED PROCEDURES    See office notes of Dr. Ronnie Stevenson on 2023  CC: Right inguinal hernia  HPI: Sayra Gardiner is a/an 60 y.o. male who presents to [facility] for evaluation of right inguinal hernia. Patient states that several months ago he noticed a bulge in his right groin which continues to increase in size. He denies discomfort, but states that recently when he eats he becomes full quickly and had increased reflux which is not normal for him. He denies changes in bowel movements. He is up-to-date on colon cancer screening. He does have a history of prostate cancer status post radiation and seeds. Patient underwent a CT scan prior to evaluation with oral contrast which notes that there is a right inguinal hernia that is can tenting bowel. Contrast moves through the hernia without issues. Above reviewed with patient and he concurs    Patient has symptoms of : bulging out . Patient noticed the Hernia 2 months ago. Patient reports that the Hernia grew in size and is  non tender and more expansile . Pt states that now he is more conscious of it. Pt reports that he is  more relieved with hernia when: lying down ; standing aggravates hernia more. Pt denies any constipation or bowel blockage. No fever or chills. No significant medical history. Today patient exhibits BP with elevated diastolic reading. Writer did manual BP and did obtain 138/ 90. Patient was advised to monitor at home. Prior reading indicate normal readings. Pt denies any sxs.       Anticoagulants or blood thinners: no     Pt denies any  chest pain, palpitations or diaphoresis. No recent URI, SOB, fever or chills. Patient denies any personal hx of blood clots. Functional Capacity per patient:              1. Patient is able to walk 2 city blocks on level ground without SOB. 2. Patient is able to climb 2 flights of stairs without SOB. Patient denies any personal or family hx of problems with  Anesthesia. Imaging:   CT OF THE ABDOMEN AND PELVIS WITH CONTRAST 1/13/2023  1. Small bowel containing right inguinal hernia without associated  inflammation or obstruction. 2. Chronic findings outlined in the body of the report. PAST MEDICAL HISTORY     Past Medical History:   Diagnosis Date    Acute left-sided thoracic back pain 02/07/2018    Cancer [facility])      with radiation and seeds, prostate    Macular degeneration     Left eye     SURGICAL HISTORY       Past Surgical History:   Procedure Laterality Date    COLONOSCOPY         FAMILY HISTORY     History reviewed. No pertinent family history. SOCIAL HISTORY       Social History     Socioeconomic History    Marital status:      Spouse name: None    Number of children: None    Years of education: None    Highest education level: None   Tobacco Use    Smoking status: Never    Smokeless tobacco: Never   Vaping Use    Vaping Use: Never used   Substance and Sexual Activity    Alcohol use: No    Drug use: No           REVIEW OF SYSTEMS      No Known Allergies    Current Outpatient Medications on File Prior to Encounter   Medication Sig Dispense Refill    tadalafil (CIALIS) 5 MG tablet Take 5 mg by mouth as needed for Erectile Dysfunction       No current facility-administered medications on file prior to encounter. General health:  Fairly good. No fever or chills. Skin:  No itching, redness or rash. HEENT:  No headache, epistaxis or sore throat. Neck:  No pain, stiffness or masses. Cardiovascular/Respiratory system:  No chest pain, palpitation or shortness of breath. Gastrointestinal tract: See HPI. Genitourinary:  No burning on micturition. No hesitancy, urgency, frequency or discoloration of urine. Locomotor:  No bone or joint pains. No swelling. Neuropsychiatric:  No referable complaints. Negative except for what is mentioned in the HPI. GENERAL PHYSICAL EXAM:     Vitals: BP (!) 135/100   Pulse 68   Temp 97.9 °F (36.6 °C) (Temporal)   Resp 20   Ht 5' 11.5\" (1.816 m)   Wt 213 lb (96.6 kg)   SpO2 100%   BMI 29.29 kg/m²  Body mass index is 29.29 kg/m². Retaken at  138/90 RA. GENERAL APPEARANCE:   Wilfredo List is 60 y.o., male, moderately obese, nourished, conscious, alert. Does not appear to be distress or pain at this time. SKIN:  Warm, dry, no cyanosis or jaundice. HEAD:  Normocephalic, atraumatic, no swelling or tenderness. EYES:  Pupils equal, reactive to light. EARS:  No discharge, no marked hearing loss. NOSE:  No rhinorrhea, epistaxis or septal deformity. THROAT:  Not congested. No ulceration bleeding or discharge. NECK:  No stiffness, trachea central.                  CHEST:  Symmetrical and equal on expansion. HEART:  RRR S1 > S2. No audible murmurs or gallops. LUNGS:  Equal on expansion, normal breath sounds. No adventitious sounds. ABDOMEN:  Obese. Soft on palpation. Normoactive bowel sounds. No localized tenderness. No guarding or rigidity. Inguinal Hernia- exam deferred to surgeon         LYMPHATICS:  No palpable cervical lymphadenopathy. LOCOMOTOR, BACK AND SPINE:  No tenderness or deformities. EXTREMITIES:  Symmetrical, no pretibial edema. No calf tenderness.   No discoloration or ulcerations. NEUROLOGIC:  The patient is conscious, alert, oriented,Cranial nerve II-XII intact, taste and smell were not examined. No apparent focal sensory or motor deficits. Lab Results   Component Value Date    WBC 6.9 01/13/2023    RBC 5.64 01/13/2023    HGB 16.3 01/13/2023    HCT 48.7 01/13/2023    MCV 86.3 01/13/2023    MCH 28.9 01/13/2023    MCHC 33.5 01/13/2023    RDW 14.3 01/13/2023     01/13/2023    MPV 11.3 01/13/2023        Lab Results   Component Value Date     01/13/2023    K 4.3 01/13/2023     01/13/2023    CO2 29 01/13/2023    BUN 17 01/13/2023    CREATININE 1.19 01/13/2023    GLUCOSE 107 (H) 01/13/2023    CALCIUM 9.1 01/13/2023                                         EKG REVIEW        Today's EKG reads Normal  sinus rhythm with left atrial enlargement           PROVISIONAL DIAGNOSES / SURGERY:      LAPAROSCOPIC ROBOTIC XI RIGHT INGUINAL HERNIA REPAIR W/MESH, POSSIBLE OPEN, POSSIBLE BILATERAL, ALL INDICATED PROCEDURES    RIGHT INGUINAL HERNIA      Patient Active Problem List    Diagnosis Date Noted    Non-recurrent unilateral inguinal hernia without obstruction or gangrene 01/30/2023    Acute left-sided thoracic back pain 02/07/2018    Elevated blood pressure reading 03/26/2014    Prostate cancer ([facility] [address] 10/22/2013    Dysuria 10/22/2013       CLEARANCE: Dr. Nba Driver, anesthesia, was contacted and informed of patient's history and planned surgery. Orders received and no clearance required.       ZEINA HANEY, APRN - CNP on 2/1/2023 at 12:12 PM    Total time spent on encounter- PAT provider minutes: 21-30 minutes No

## (undated) DEVICE — BLANKET WRM W29.9XL79.1IN UP BODY FORC AIR MISTRAL-AIR

## (undated) DEVICE — GOWN,SIRUS,NONRNF,SETINSLV,XL,20/CS: Brand: MEDLINE

## (undated) DEVICE — 3M™ IOBAN™ 2 ANTIMICROBIAL INCISE DRAPE 6650EZ: Brand: IOBAN™ 2

## (undated) DEVICE — MERCY HEALTH ST CHARLES: Brand: MEDLINE INDUSTRIES, INC.

## (undated) DEVICE — STRIP,CLOSURE,WOUND,MEDI-STRIP,1/2X4: Brand: MEDLINE

## (undated) DEVICE — TROCAR: Brand: KII FIOS FIRST ENTRY

## (undated) DEVICE — SOLUTION ANTIFOG VIS SYS CLEARIFY LAPSCP

## (undated) DEVICE — NEEDLE, QUINCKE, 18GX3.5": Brand: MEDLINE

## (undated) DEVICE — SYRINGE MED 30ML STD CLR PLAS LUERLOCK TIP N CTRL DISP

## (undated) DEVICE — ARM DRAPE

## (undated) DEVICE — TIP COVER ACCESSORY

## (undated) DEVICE — CANNULA SEAL

## (undated) DEVICE — GLOVE SURG SZ 7 CRM LTX FREE POLYISOPRENE POLYMER BEAD ANTI

## (undated) DEVICE — BLADELESS OBTURATOR: Brand: WECK VISTA

## (undated) DEVICE — SUTURE MCRYL + SZ 4-0 L27IN ABSRB UD L19MM PS-2 3/8 CIR MCP426H

## (undated) DEVICE — INSUFFLATION NEEDLE TO ESTABLISH PNEUMOPERITONEUM.: Brand: INSUFFLATION NEEDLE

## (undated) DEVICE — TOTAL TRAY, DB, 100% SILI FOLEY, 16FR 10: Brand: MEDLINE

## (undated) DEVICE — NEEDLE SPNL 18GA L3.5IN W/ QNCKE SHARPER BVL DURA CLICK

## (undated) DEVICE — ST CHARLES GEN LAPAROSCOPY PK: Brand: MEDLINE INDUSTRIES, INC.

## (undated) DEVICE — ADHESIVE SKIN CLOSURE TOP 36 CC HI VISC DERMBND MINI

## (undated) DEVICE — COVER,MAYO STAND,XL,STERILE: Brand: MEDLINE

## (undated) DEVICE — SUTURE ABSORBABLE MONOFILAMENT 2-0 GS-22 9 IN UD V-LOC 90 VLOCM2245

## (undated) DEVICE — SUTURE NONABSORBABLE BRAIDED 2-0 CT-2 1X30 IN ETHBND EXCEL X411H

## (undated) DEVICE — MARKER,SKIN,WI/RULER AND LABELS: Brand: MEDLINE